# Patient Record
Sex: MALE | Race: WHITE | NOT HISPANIC OR LATINO | Employment: FULL TIME | URBAN - METROPOLITAN AREA
[De-identification: names, ages, dates, MRNs, and addresses within clinical notes are randomized per-mention and may not be internally consistent; named-entity substitution may affect disease eponyms.]

---

## 2018-09-24 ENCOUNTER — OFFICE VISIT (OUTPATIENT)
Dept: FAMILY MEDICINE | Facility: CLINIC | Age: 53
End: 2018-09-24
Payer: COMMERCIAL

## 2018-09-24 VITALS
HEART RATE: 64 BPM | SYSTOLIC BLOOD PRESSURE: 159 MMHG | BODY MASS INDEX: 34.86 KG/M2 | WEIGHT: 257 LBS | RESPIRATION RATE: 18 BRPM | TEMPERATURE: 97.3 F | DIASTOLIC BLOOD PRESSURE: 87 MMHG | OXYGEN SATURATION: 98 %

## 2018-09-24 DIAGNOSIS — S83.411A SPRAIN OF MEDIAL COLLATERAL LIGAMENT OF RIGHT KNEE, INITIAL ENCOUNTER: Primary | ICD-10-CM

## 2018-09-24 PROCEDURE — 99214 OFFICE O/P EST MOD 30 MIN: CPT | Performed by: FAMILY MEDICINE

## 2018-09-24 ASSESSMENT — PAIN SCALES - GENERAL: PAINLEVEL: SEVERE PAIN (6)

## 2018-09-24 NOTE — NURSING NOTE
Chief Complaint   Patient presents with     Knee Pain     right x 3 weeks       Initial /87  Pulse 64  Temp 97.3  F (36.3  C) (Oral)  Resp 18  Wt 257 lb (116.6 kg)  SpO2 98%  BMI 34.86 kg/m2 Estimated body mass index is 34.86 kg/(m^2) as calculated from the following:    Height as of 3/28/16: 6' (1.829 m).    Weight as of this encounter: 257 lb (116.6 kg).  Medication Reconciliation: complete  Melvi Michele M.A.

## 2018-09-24 NOTE — PROGRESS NOTES
SUBJECTIVE:  Mason Durán is a 52 year old male who is seen as self referral for right knee pain that started  3 weeks ago. The onset of the pain was gradual.    The pain was first noticed while the patient was walking.    The patient reports that these symptoms have been waxing and waning since that time. He reports popping, clicking of the knee. He denies locking of the knee.   Palliative factors for the pain include:  Rest   Provacative factors include: pivoting while weight bearing on that leg  The patient relates no prior history of problems in this knee.  The patient reports that he is employed as a  and does have a physical job that demands use of the affected knee.  The patient reports that he does not play any sports but does hunt. He was in the woods recently getting ready for deer season.   The patients past medical, surgical, social and family histories were reviewed.    He denies a history of gout personally or in his family.      Social History     Social History     Marital status:      Spouse name: N/A     Number of children: N/A     Years of education: N/A     Social History Main Topics     Smoking status: Current Every Day Smoker     Packs/day: 1.00     Types: Cigarettes     Smokeless tobacco: Never Used     Alcohol use Yes      Comment: occasionally     Drug use: No     Sexual activity: Yes     Partners: Female     Other Topics Concern     None     Social History Narrative           OBJECTIVE:  /87  Pulse 64  Temp 97.3  F (36.3  C) (Oral)  Resp 18  Wt 257 lb (116.6 kg)  SpO2 98%  BMI 34.86 kg/m2  GENERAL APPEARANCE: healthy, alert and no distress  GAIT: NORMAL   SKIN: no suspicious lesions or rashes  NEURO: Normal strength and tone, mentation intact and speech normal  PSYCH:  mentation appears normal and affect normal/bright  KNEE EXAM:  Inspection: No effusion, No quad atrophy  Tender: MCL  Non-tender: lateral patellar facet, medial patellar facet, inferior pole  patella, patella tendon, quadriceps insertion, LCL, lateral joint line, medial joint line, medial tibial plateau, lateral tibial plateau, medial femoral condyle, lateral femoral condyle, distal IT band, prepatellar bursa, popliteal region, pes anserine bursa  Active Range of Motion: full flexion, full extension    Special tests: no apprehension with lateral stress of the patella, no apprehension with medial stress of the patella, normal Valgus stress test but it did induce pain on the medial knee, normal Varus, negative Lachman's test, negative Albert's , negative Apley's Compression test, negative Apley's distraction test        X-RAY INTERPRETATION:  No Knee X-Rays indicated    ASSESSMENT/PLAN:  medial collateral ligament strain grade    NSAID was prescribed, I recommend(ed) ice at the end of the day(s) if painful and heat and stretching at the beginning of the day     Follow up in 2 weeks if not improving.

## 2018-09-24 NOTE — MR AVS SNAPSHOT
After Visit Summary   9/24/2018    Mason Durán    MRN: [id number]           Patient Information     Date Of Birth          1965        Visit Information        Provider Department      9/24/2018 1:45 PM Javier Vang MD [facility]        Today's Diagnoses     Sprain of medial collateral ligament of right knee, initial encounter    -  1      Care Instructions      Understanding Medial Collateral Ligament Sprain    The knee is a complex joint where the thighbone (femur) meets the shinbone (tibia). Strong tissues called ligaments connect these bones together. Ligaments also keep the bones aligned, so the knee only bends how it is supposed to. The medial collateral ligament (MCL) runs across the knee joint on the medial side of the leg. Injury to this ligament may be very painful. The knee may also not work the way it should.  Causes of an MCL sprain  An MCL sprain often happens when the knee joint is pushed beyond its normal range of motion. It is most common during a blow to the knee from the outside, pushing the knee inward. It may also happen if the knee is forced into a twist. These movements stretch and tear the MCL. Other parts of the knee may be damaged along with the MCL.  Symptoms of an MCL sprain  These include:    Knee pain    Knee swelling    Locking of the joint    Wobbly or unstable feeling in the joint  Treatment for an MCL sprain  Treatment will depend on the severity of the sprain and whether there is damage to other parts of the knee. Options often include:    Rest. This allows the knee to heal. Activities that stress the knee should be avoided. Crutches, a knee brace, or both may also be recommended for a short time.    Cold packs and elevation of the knee. These help reduce swelling and relieve pain.    Compression. The knee may be wrapped with a bandage to help reduce swelling.    Medicines. These help relieve pain and swelling.    Exercises. These help  "improve the knee s stability, strength, and range of motion.  If the injury is severe or several parts of the joint are involved, surgery may be an option. Surgery repairs the MCL and any other damaged structures.     When to call your healthcare provider  Call your healthcare provider right away if you have any of these:    Pain, swelling, or instability that doesn t get better with treatment or gets worse    New symptoms   Date Last Reviewed: 3/10/2016    [id number] [employer]. [address] [place] [address]. All rights reserved. This information is not intended as a substitute for professional medical care. Always follow your healthcare professional's instructions.                Follow-ups after your visit        Follow-up notes from your care team     Return in about 2 weeks (around 10/8/2018), or if symptoms worsen or fail to improve.      Who to contact     If you have questions or need follow up information about today's clinic visit or your schedule please contact [facility] directly at [phone].  Normal or non-critical lab and imaging results will be communicated to you by Arrogenehart, letter or phone within 4 business days after the clinic has received the results. If you do not hear from us within 7 days, please contact the clinic through [employer] or phone. If you have a critical or abnormal lab result, we will notify you by phone as soon as possible.  Submit refill requests through [employer]ipai or call your pharmacy and they will forward the refill request to us. Please allow 3 business days for your refill to be completed.          Additional Information About Your Visit        ArrogeneharNexercise Information     [employer] lets you send messages to your doctor, view your test results, renew your prescriptions, schedule appointments and more. To sign up, go to [email or web address] . Click on \"Log in\" on the left side of the screen, which will take you to the Welcome page. Then " "click on \"Sign up Now\" on the right side of the page.     You will be asked to enter the access code listed below, as well as some personal information. Please follow the directions to create your username and password.     Your access code is: [email or web address]  Expires: 2018  2:38 PM     Your access code will  in 90 days. If you need help or a new code, please call your [place] clinic or [phone].        Care EveryWhere ID     This is your Care EveryWhere ID. This could be used by other organizations to access your [place] medical records  [id number]        Your Vitals Were     Pulse Temperature Respirations Pulse Oximetry BMI (Body Mass Index)       64 97.3  F (36.3  C) (Oral) 18 98% 34.86 kg/m2        Blood Pressure from Last 3 Encounters:   18 159/87   16 122/82   16 129/82    Weight from Last 3 Encounters:   18 257 lb (116.6 kg)   16 252 lb (114.3 kg)   16 263 lb (119.3 kg)              Today, you had the following     No orders found for display         Today's Medication Changes          These changes are accurate as of 18  2:38 PM.  If you have any questions, ask your nurse or doctor.               Start taking these medicines.        Dose/Directions    nabumetone 750 MG tablet   Commonly known as:  RELAFEN   Used for:  Sprain of medial collateral ligament of right knee, initial encounter   Started by:  Javier Vang MD        1 tablet twice a day for 10 days and then twice a day only as needed   Quantity:  60 tablet   Refills:  1            Where to get your medicines      These medications were sent to [facility] [address] [facility]  [facility] [address] [facility] [address] [facility]   [facility] [address]     Phone:  [phone]     nabumetone 750 MG tablet                Primary Care Provider Office Phone # Fax #    Samuel Anders [phone] [address] [facility] " [phone]        Equal Access to Services     [facility] : Hadii aad ku hadyumikotorin Gretchenali, wabonnieda lufortunatoronha, qaem ankuralexclayton jett, uyen gomes. So [facility] [phone].    ATENCIÓN: Si habla español, tiene a soto disposición servicios gratuitos de asistencia lingüística. Llame al [phone].    We comply with applicable federal civil rights laws and [place] laws. We do not discriminate on the basis of race, color, national origin, age, disability, sex, sexual orientation, or gender identity.            Thank you!     Thank you for choosing [facility]  for your care. Our goal is always to provide you with excellent care. Hearing back from our patients is one way we can continue to improve our services. Please take a few minutes to complete the written survey that you may receive in the mail after your visit with us. Thank you!             Your Updated Medication List - Protect others around you: Learn how to safely use, store and throw away your medicines at www.disposemymeds.org.          This list is accurate as of 9/24/18  2:38 PM.  Always use your most recent med list.                   Brand Name Dispense Instructions for use Diagnosis    ibuprofen 200 MG tablet    ADVIL/MOTRIN     Take 200 mg by mouth every 4 hours as needed.        nabumetone 750 MG tablet    RELAFEN    60 tablet    1 tablet twice a day for 10 days and then twice a day only as needed    Sprain of medial collateral ligament of right knee, initial encounter       spacer/aero-hold chamber mask Chayito     1 each    1 Adult size spacer to use with MDI inhalers.    Acute bronchospasm

## 2018-09-24 NOTE — PATIENT INSTRUCTIONS
Understanding Medial Collateral Ligament Sprain    The knee is a complex joint where the thighbone (femur) meets the shinbone (tibia). Strong tissues called ligaments connect these bones together. Ligaments also keep the bones aligned, so the knee only bends how it is supposed to. The medial collateral ligament (MCL) runs across the knee joint on the medial side of the leg. Injury to this ligament may be very painful. The knee may also not work the way it should.  Causes of an MCL sprain  An MCL sprain often happens when the knee joint is pushed beyond its normal range of motion. It is most common during a blow to the knee from the outside, pushing the knee inward. It may also happen if the knee is forced into a twist. These movements stretch and tear the MCL. Other parts of the knee may be damaged along with the MCL.  Symptoms of an MCL sprain  These include:    Knee pain    Knee swelling    Locking of the joint    Wobbly or unstable feeling in the joint  Treatment for an MCL sprain  Treatment will depend on the severity of the sprain and whether there is damage to other parts of the knee. Options often include:    Rest. This allows the knee to heal. Activities that stress the knee should be avoided. Crutches, a knee brace, or both may also be recommended for a short time.    Cold packs and elevation of the knee. These help reduce swelling and relieve pain.    Compression. The knee may be wrapped with a bandage to help reduce swelling.    Medicines. These help relieve pain and swelling.    Exercises. These help improve the knee s stability, strength, and range of motion.  If the injury is severe or several parts of the joint are involved, surgery may be an option. Surgery repairs the MCL and any other damaged structures.     When to call your healthcare provider  Call your healthcare provider right away if you have any of these:    Pain, swelling, or instability that doesn t get better with treatment or gets  worse    New symptoms   Date Last Reviewed: 3/10/2016    [id number] [employer]. [address] [facility], [place], [place] [address]. All rights reserved. This information is not intended as a substitute for professional medical care. Always follow your healthcare professional's instructions.

## 2018-11-28 ENCOUNTER — OFFICE VISIT (OUTPATIENT)
Dept: FAMILY MEDICINE | Facility: CLINIC | Age: 53
End: 2018-11-28
Payer: COMMERCIAL

## 2018-11-28 VITALS
DIASTOLIC BLOOD PRESSURE: 67 MMHG | BODY MASS INDEX: 34.58 KG/M2 | SYSTOLIC BLOOD PRESSURE: 139 MMHG | HEART RATE: 64 BPM | OXYGEN SATURATION: 99 % | RESPIRATION RATE: 14 BRPM | WEIGHT: 255 LBS | TEMPERATURE: 98 F

## 2018-11-28 DIAGNOSIS — Z01.818 PREOP GENERAL PHYSICAL EXAM: Primary | ICD-10-CM

## 2018-11-28 DIAGNOSIS — M25.561 RIGHT KNEE PAIN, UNSPECIFIED CHRONICITY: ICD-10-CM

## 2018-11-28 LAB — HGB BLD-MCNC: 14.7 G/DL (ref 13.3–17.7)

## 2018-11-28 PROCEDURE — 36415 COLL VENOUS BLD VENIPUNCTURE: CPT | Performed by: PHYSICIAN ASSISTANT

## 2018-11-28 PROCEDURE — 85018 HEMOGLOBIN: CPT | Performed by: PHYSICIAN ASSISTANT

## 2018-11-28 PROCEDURE — 93000 ELECTROCARDIOGRAM COMPLETE: CPT | Performed by: PHYSICIAN ASSISTANT

## 2018-11-28 PROCEDURE — 99214 OFFICE O/P EST MOD 30 MIN: CPT | Performed by: PHYSICIAN ASSISTANT

## 2018-11-28 ASSESSMENT — ANXIETY QUESTIONNAIRES
7. FEELING AFRAID AS IF SOMETHING AWFUL MIGHT HAPPEN: NOT AT ALL
2. NOT BEING ABLE TO STOP OR CONTROL WORRYING: NOT AT ALL
IF YOU CHECKED OFF ANY PROBLEMS ON THIS QUESTIONNAIRE, HOW DIFFICULT HAVE THESE PROBLEMS MADE IT FOR YOU TO DO YOUR WORK, TAKE CARE OF THINGS AT HOME, OR GET ALONG WITH OTHER PEOPLE: NOT DIFFICULT AT ALL
5. BEING SO RESTLESS THAT IT IS HARD TO SIT STILL: NOT AT ALL
6. BECOMING EASILY ANNOYED OR IRRITABLE: NOT AT ALL
GAD7 TOTAL SCORE: 0
3. WORRYING TOO MUCH ABOUT DIFFERENT THINGS: NOT AT ALL
1. FEELING NERVOUS, ANXIOUS, OR ON EDGE: NOT AT ALL

## 2018-11-28 ASSESSMENT — PATIENT HEALTH QUESTIONNAIRE - PHQ9
SUM OF ALL RESPONSES TO PHQ QUESTIONS 1-9: 1
5. POOR APPETITE OR OVEREATING: NOT AT ALL

## 2018-11-28 NOTE — PROGRESS NOTES
[facility]  [address] [facility] [address] [phone]  Dept: [phone]    PRE-OP EVALUATION:  Today's date: 2018    Mason Durán (: 1965) presents for pre-operative evaluation assessment as requested by Dr. Velazquez.  He requires evaluation and anesthesia risk assessment prior to undergoing surgery/procedure for treatment of right knee pain .    Fax number for surgical facility: [phone]  Primary Physician: Samuel Anders  Type of Anesthesia Anticipated: General    Patient has a Health Care Directive or Living Will:  NO    Preop Questions 2018   Who is doing your surgery? Dr Velazquez   What are you having done? right knee partial medial meniscectomy   Date of Surgery/Procedure: 2018   1.  Do you have a history of Heart attack, stroke, stent, coronary bypass surgery, or other heart surgery? No   2.  Do you ever have any pain or discomfort in your chest? No   3.  Do you have a history of  Heart Failure? No   4.   Are you troubled by shortness of breath when:  walking on a level surface, or up a slight hill, or at night? No   5.  Do you currently have a cold, bronchitis or other respiratory infection? No   6.  Do you have a cough, shortness of breath, or wheezing? No   7.  Do you sometimes get pains in the calves of your legs when you walk? YES - from injury   8. Do you or anyone in your family have previous history of blood clots? No   9.  Do you or does anyone in your family have a serious bleeding problem such as prolonged bleeding following surgeries or cuts? No   10. Have you ever had problems with anemia or been told to take iron pills? No   11. Have you had any abnormal blood loss such as black, tarry or bloody stools? No   12. Have you ever had a blood transfusion? No   13. Have you or any of your relatives ever had problems with anesthesia? No   14. Do you have sleep apnea, excessive snoring or daytime drowsiness? No   15. Do you  have any prosthetic heart valves? No   16. Do you have prosthetic joints? No         HPI:     HPI related to upcoming procedure: insidious right knee pain about 3 months ago.      See problem list for active medical problems.  Problems all longstanding and stable, except as noted/documented.  See ROS for pertinent symptoms related to these conditions.                                                                                                                                                          .    MEDICAL HISTORY:     Patient Active Problem List    Diagnosis Date Noted     Right knee pain, unspecified chronicity 11/28/2018     Priority: Medium     10 year risk of MI or stroke 7.5% or greater 05/12/2015     Priority: Medium     Opiate use 04/30/2015     Priority: Medium     Patient is followed by [facility] for ongoing prescription of narcotic pain medicine.    Med: Norco.   Maximum use: 40 pills, 3-4 times per year  Expected duration: indefinite  Narcotic agreement on file: YES  Clinic visit recommended: for each refill         Lumbosacral radiculopathy 01/07/2014     Priority: Medium     Hyperlipidemia LDL goal <160 04/22/2013     Priority: Medium     Obesity 05/14/2012     Priority: Medium     Chronic low back pain 05/31/2011     Priority: Medium      Past Medical History:   Diagnosis Date     NO ACTIVE PROBLEMS      Past Surgical History:   Procedure Laterality Date     BACK SURGERY      discectomy     No current outpatient prescriptions on file.     OTC products: None, except as noted above    Allergies   Allergen Reactions     Nkda [No Known Drug Allergies]       Latex Allergy: NO    Social History   Substance Use Topics     Smoking status: Current Every Day Smoker     Packs/day: 1.00     Types: Cigarettes     Smokeless tobacco: Never Used     Alcohol use Yes      Comment: occasionally     History   Drug Use No       REVIEW OF SYSTEMS:   CONSTITUTIONAL: NEGATIVE for fever, chills, change in  weight  INTEGUMENTARY/SKIN: NEGATIVE for worrisome rashes, moles or lesions  EYES: NEGATIVE for vision changes or irritation  ENT/MOUTH: NEGATIVE for ear, mouth and throat problems  RESP: NEGATIVE for significant cough or SOB  CV: NEGATIVE for chest pain, palpitations or peripheral edema  GI: NEGATIVE for nausea, abdominal pain, heartburn, or change in bowel habits  : NEGATIVE for frequency, dysuria, or hematuria  MUSCULOSKELETAL: NEGATIVE for significant arthralgias or myalgia  NEURO: NEGATIVE for weakness, dizziness or paresthesias  ENDOCRINE: NEGATIVE for temperature intolerance, skin/hair changes  HEME: NEGATIVE for bleeding problems  PSYCHIATRIC: NEGATIVE for changes in mood or affect    EXAM:   /67  Pulse 64  Temp 98  F (36.7  C) (Oral)  Resp 14  Wt 255 lb (115.7 kg)  SpO2 99%  BMI 34.58 kg/m2    GENERAL APPEARANCE: healthy, alert and no distress     EYES: EOMI,  PERRL     HENT: ear canals and TM's normal and nose and mouth without ulcers or lesions     NECK: no adenopathy, no asymmetry, masses, or scars and thyroid normal to palpation     RESP: lungs clear to auscultation - no rales, rhonchi or wheezes     CV: regular rates and rhythm, normal S1 S2, no S3 or S4 and no murmur, click or rub     ABDOMEN:  soft, nontender, no HSM or masses and bowel sounds normal     MS: extremities normal- no gross deformities noted, no evidence of inflammation in joints, FROM in all extremities.     SKIN: no suspicious lesions or rashes     NEURO: Normal strength and tone, sensory exam grossly normal, mentation intact and speech normal     PSYCH: mentation appears normal. and affect normal/bright     LYMPHATICS: No cervical adenopathy    DIAGNOSTICS:     EKG: appears normal, NSR, normal axis, normal intervals, no acute ST/T changes c/w ischemia, no LVH by voltage criteria, there are no prior tracings available  Labs Resulted Today:   Results for orders placed or performed in visit on 11/28/18   Hemoglobin   Result  Value Ref Range    Hemoglobin 14.7 13.3 - 17.7 g/dL       Recent Labs   Lab Test  05/09/15   0908  09/01/12   1119   HGB  15.5   --    PLT  214   --    NA  140  139   POTASSIUM  4.6  4.8   CR  0.94  0.95        IMPRESSION:   Reason for surgery/procedure:  treatment of right knee pain     The proposed surgical procedure is considered INTERMEDIATE risk.    REVISED CARDIAC RISK INDEX  The patient has the following serious cardiovascular risks for perioperative complications such as (MI, PE, VFib and 3  AV Block):  No serious cardiac risks  INTERPRETATION: 0 risks: Class I (very low risk - 0.4% complication rate)    The patient has the following additional risks for perioperative complications:  No identified additional risks      ICD-10-CM    1. Preop general physical exam Z01.818 EKG 12-lead complete w/read - Clinics     Hemoglobin   2. Right knee pain, unspecified chronicity M25.561 Hemoglobin       RECOMMENDATIONS:     APPROVAL GIVEN to proceed with proposed procedure, without further diagnostic evaluation       Signed Electronically by: Hong Carpio PA-C  Discussed this patient with  Hong Carpio and agree with above assessment and plan. The patient is an acceptable candidate for the proposed anasthesia.  Javier Vang MD      Copy of this evaluation report is provided to requesting physician.    [facility] Preop Guidelines    Revised Cardiac Risk Index

## 2018-11-28 NOTE — MR AVS SNAPSHOT
After Visit Summary   11/28/2018    Mason Durán    MRN: [id number]           Patient Information     Date Of Birth          1965        Visit Information        Provider Department      11/28/2018 2:30 PM Hong Carpio PA-C [facility]        Today's Diagnoses     Preop general physical exam    -  1    Right knee pain, unspecified chronicity          Care Instructions      Before Your Surgery      Call your surgeon if there is any change in your health. This includes signs of a cold or flu (such as a sore throat, runny nose, cough, rash or fever).    Do not smoke, drink alcohol or take over the counter medicine (unless your surgeon or primary care doctor tells you to) for the 24 hours before and after surgery.    If you take prescribed drugs: Follow your doctor s orders about which medicines to take and which to stop until after surgery.    Eating and drinking prior to surgery: follow the instructions from your surgeon    Take a shower or bath the night before surgery. Use the soap your surgeon gave you to gently clean your skin. If you do not have soap from your surgeon, use your regular soap. Do not shave or scrub the surgery site.  Wear clean pajamas and have clean sheets on your bed.           Follow-ups after your visit        Who to contact     If you have questions or need follow up information about today's clinic visit or your schedule please contact [facility] directly at [phone].  Normal or non-critical lab and imaging results will be communicated to you by MyChart, letter or phone within 4 business days after the clinic has received the results. If you do not hear from us within 7 days, please contact the clinic through MyChart or phone. If you have a critical or abnormal lab result, we will notify you by phone as soon as possible.  Submit refill requests through [employer] or call your pharmacy and they will forward the refill request to us.  "Please allow 3 business days for your refill to be completed.          Additional Information About Your Visit        MyChart Information     [employer] [facility] [employer] lets you send messages to your doctor, view your test results, renew your prescriptions, schedule appointments and more. To sign up, go to [email or web address] . Click on \"Log in\" on the left side of the screen, which will take you to the Welcome page. Then click on \"Sign up Now\" on the right side of the page.     You will be asked to enter the access code listed below, as well as some personal information. Please follow the directions to create your username and password.     Your access code is: [email or web address]  Expires: 2018  1:38 PM     Your access code will  in 90 days. If you need help or a new code, please call your [facility] clinic or [phone].        Care EveryWhere ID     This is your [facility] EveryWhere ID. This could be used by other organizations to access your [facility] medical records  [id number]        Your Vitals Were     Pulse Temperature Respirations Pulse Oximetry BMI (Body Mass Index)       64 98  F (36.7  C) (Oral) 14 99% 34.58 kg/m2        Blood Pressure from Last 3 Encounters:   18 139/67   18 159/87   16 122/82    Weight from Last 3 Encounters:   18 255 lb (115.7 kg)   18 257 lb (116.6 kg)   16 252 lb (114.3 kg)              We Performed the Following     EKG 12-lead complete w/read - Clinics     Hemoglobin        Primary Care Provider Office Phone # Fax #    Samuel Anders [phone] [address] [facility] [address]        Equal Access to Services     JAIME BELTRE : Jcarlos Amaya, billie jordan, jero pascualalmaclayton jett, uyen soni So [facility] [phone].    ATENCIÓN: Si habla español, tiene a soto disposición servicios gratuitos de asistencia lingüística. Sabrina al [phone].    We comply with applicable federal " civil rights laws and [place] laws. We do not discriminate on the basis of race, color, national origin, age, disability, sex, sexual orientation, or gender identity.            Thank you!     Thank you for [facility]  for your care. Our goal is always to provide you with excellent care. Hearing back from our patients is one way we can continue to improve our services. Please take a few minutes to complete the written survey that you may receive in the mail after your visit with us. Thank you!             Your Updated Medication List - Protect others around you: Learn how to safely use, store and throw away your medicines at www.disposemymeds.org.      Notice  As of 11/28/2018  2:53 PM    You have not been prescribed any medications.

## 2018-11-29 ASSESSMENT — ANXIETY QUESTIONNAIRES: GAD7 TOTAL SCORE: 0

## 2019-08-21 ENCOUNTER — OFFICE VISIT (OUTPATIENT)
Dept: FAMILY MEDICINE | Facility: CLINIC | Age: 54
End: 2019-08-21
Payer: COMMERCIAL

## 2019-08-21 VITALS
OXYGEN SATURATION: 96 % | SYSTOLIC BLOOD PRESSURE: 155 MMHG | BODY MASS INDEX: 37.52 KG/M2 | DIASTOLIC BLOOD PRESSURE: 80 MMHG | WEIGHT: 268 LBS | HEART RATE: 91 BPM | HEIGHT: 71 IN | TEMPERATURE: 98.8 F

## 2019-08-21 DIAGNOSIS — R42 LIGHTHEADEDNESS: ICD-10-CM

## 2019-08-21 DIAGNOSIS — R03.0 ELEVATED BLOOD-PRESSURE READING WITHOUT DIAGNOSIS OF HYPERTENSION: ICD-10-CM

## 2019-08-21 DIAGNOSIS — H61.23 BILATERAL IMPACTED CERUMEN: Primary | ICD-10-CM

## 2019-08-21 PROBLEM — E66.01 MORBID OBESITY (H): Status: ACTIVE | Noted: 2019-08-21

## 2019-08-21 PROCEDURE — 99214 OFFICE O/P EST MOD 30 MIN: CPT | Performed by: FAMILY MEDICINE

## 2019-08-21 ASSESSMENT — MIFFLIN-ST. JEOR: SCORE: 2082.77

## 2019-08-21 NOTE — PROGRESS NOTES
"Chief complaint: ear plugged    Patient usually uses ear muffs  Past month using the ear plugs  The left ear started getting plugged  Patient tried to use a qtip no relief    BP elevated today but asymptomatic. No headache no blurring of vision no chest pain no shortness of breath no dizziness no unsteadiness no numbness no weakness    Patient however said 3 days ago it was very hot and patient was in the grocery with wife  Had a spell of lightheadedness he thought he was going to pass out  Just a couple of seconds  He sat down then got better  Chest pain or palpitation: No   Motor,sensory weakness, or slurring of speech: No  Headache: No  Blurring of vision : No  Nausea: YES  Vomiting: No  Abdominal pain: No  Recent trauma: No    Tried supportive treatment  At home no relief  Additional Information: above    Problem list and histories reviewed & adjusted, as indicated.  Additional history: as documented    Problem list, Medication list, Allergies, and Medical/Social/Surgical histories reviewed in EPIC and updated as appropriate.    ROS:  Constitutional, HEENT, cardiovascular, pulmonary, gi and gu systems are negative, except as otherwise noted.    OBJECTIVE:                                                    BP (!) 155/80   Pulse 91   Temp 98.8  F (37.1  C) (Oral)   Ht 1.803 m (5' 11\")   Wt 121.6 kg (268 lb)   SpO2 96%   BMI 37.38 kg/m    Body mass index is 37.38 kg/m .  GENERAL: healthy, alert and no distress  EYES: Eyes grossly normal to inspection, PERRL and conjunctivae and sclerae normal  HENT:   Initially had bilateral impacted cerumen - RELIEVED BY EAR FLUSHING  Remaining cerumen on right external auditory canal was removed by me by currette  ear canals and TM's normal, nose and mouth without ulcers or lesions  NECK: no adenopathy, no asymmetry, masses, or scars and thyroid normal to palpation  RESP: lungs clear to auscultation - no rales, rhonchi or wheezes  CV: regular rate and rhythm, normal S1 S2, " no S3 or S4, no murmur, click or rub, no peripheral edema and peripheral pulses strong  ABDOMEN: soft, nontender, no hepatosplenomegaly, no masses and bowel sounds normal  MS: no gross musculoskeletal defects noted, no edema  SKIN: no suspicious lesions or rashes  NEURO: Normal strength and tone, mentation intact and speech normal  PSYCH: mentation appears normal, affect normal/bright    Diagnostic Test Results:  none      ASSESSMENT/PLAN:                                                        ICD-10-CM    1. Bilateral impacted cerumen H61.23    2. Elevated blood-pressure reading without diagnosis of hypertension R03.0    3. Lightheadedness R42      Ear flushing done and currette removed ear wax - complete resolution    Your blood pressure reading was elevated today.  Recommend that you have it re-checked either at home or at our clinic within a week  Avoid cold medicines that have pseudoephedrin in it, or Sudafed. You may take regular or plain Robitussin or Mucinex  If you are unsure, please ask the pharmacist    If your blood pressure top number (systolic) 180 and above, or the bottom number (diastolic) 120 and above, you need to be seen immediately.  If persistently elevated top number 140 and above, or the bottom number 90 and above, please schedule an appointment to see a provider in clinic within a week.  If you have very elevated blood pressures, accompanied by headache, chest pain, numbness, weakness, slurring of speech, confusion, difficulty walking, call 911 and go to the ER    One episode of lightheadedness - appears vasovagal - was very hot that day  Recommended EKG and further evaluation however patient declines  If with any symptoms of chest pain or shortness of breath, lightheadedness, palpitations, feeling like passing out or change and worsening in the quality of your symptoms, please proceed to ER. Recommend follow up with PCP in a few days for re-evaluation.   Signs and symptoms of worsening  dizziness discussed. Alarm symptoms of possible CVA or cardiac events discussed. If with any of these advised to go to ER.  Patient voiced understanding and had no further questions.     MD Tabitha Champion MD  [employer]

## 2019-09-06 ENCOUNTER — ALLIED HEALTH/NURSE VISIT (OUTPATIENT)
Dept: FAMILY MEDICINE | Facility: CLINIC | Age: 54
End: 2019-09-06
Payer: COMMERCIAL

## 2019-09-06 VITALS — SYSTOLIC BLOOD PRESSURE: 142 MMHG | HEART RATE: 76 BPM | DIASTOLIC BLOOD PRESSURE: 66 MMHG

## 2019-09-06 DIAGNOSIS — Z01.30 BP CHECK: Primary | ICD-10-CM

## 2019-09-06 PROCEDURE — 99207 ZZC NO CHARGE NURSE ONLY: CPT | Performed by: FAMILY MEDICINE

## 2019-09-06 NOTE — PROGRESS NOTES
Mason BART Luis Armando was evaluated at [facility] on September 6, 2019 at which time his blood pressure was:    BP Readings from Last 3 Encounters:   09/06/19 (!) 142/66   08/21/19 (!) 155/80   11/28/18 139/67     Pulse Readings from Last 3 Encounters:   09/06/19 76   08/21/19 91   11/28/18 64       Reviewed lifestyle modifications for blood pressure control and reduction: including making healthy food choices, managing weight, getting regular exercise, smoking cessation, reducing alcohol consumption, monitoring blood pressure regularly.     Symptoms: None    BP Goal:Other: bp high    BP Assessment:  BP too high    Potential Reasons for BP too high: Unknown/Other: unknown    BP Follow-Up Plan: Referral to PCP    Recommendation to Provider: .    Note completed by:Amelia Bone RPh  [facility]    [phone]

## 2019-09-12 ENCOUNTER — TELEPHONE (OUTPATIENT)
Dept: FAMILY MEDICINE | Facility: CLINIC | Age: 54
End: 2019-09-12

## 2019-09-12 NOTE — LETTER
September 13, 2019    Mason Durán  [address] [facility] [address]    Dear Mason,     Dr Anders reviewed your blood pressure reading from the pharmacy. Dr Anders would like you to make a fasting lab appointment and an appointment for a physical. Your last visit with Dr Anders was in 2015.      Thank you,   Your [facility] Team/  [phone]

## 2019-09-12 NOTE — TELEPHONE ENCOUNTER
BP results were routed to me.    Last visit with me 2015.    Please ask the patient to see me for a physical with fasting labs.    Samuel Anders M.D.

## 2019-09-12 NOTE — TELEPHONE ENCOUNTER
The message on the voicemail says the wireless caller you are trying to reach is not available and to try back, then a fast busy signal. I could not leave a message.Maxine Hayden MA/SAY

## 2019-09-20 ENCOUNTER — TELEPHONE (OUTPATIENT)
Dept: FAMILY MEDICINE | Facility: CLINIC | Age: 54
End: 2019-09-20

## 2019-09-20 NOTE — LETTER
September 20, 2019      Masno Durán  [address] [facility] [address]        Dear [facility] Patient,    Your provider reviewed your medical record and found that you are due for colorectal cancer screening. Having regular screenings helps detect cancer early.  In the past, you have completed a FIT test (Fecal Immunochemical Test) for your colorectal cancer screening. This test looks for blood in your stool and is done once a year.   You have a few options available, the FIT testing you have done in the past or the following below:  A Cologuard test may be completed at home. Cologuard uses a DNA marker in your stool to detect colon cancer and some precancerous polyps. This test is to be completed every three years.    Another option for colorectal cancer screening is a colonoscopy test. A colonoscopy is a procedure that is performed to see the inside of the and rectum by using a long, thin, and flexible tube with a tiny video camera and light at the end. This test is done every 10 years if it is normal.    Please choose one of these options. If you would like to have one of these tests ordered, please call us at [phone] or send us a message via [employer].    If you have had a colorectal cancer screening done outside of [facility] please let us know so we can update your medical record.    Please let us know if you have any questions.    Sincerely,  [facility], [place]

## 2021-10-21 NOTE — NURSING NOTE
Chief Complaint   Patient presents with     Pre-Op Exam     Health Maintenance     SALVADOR, PHQ9, Flu       Initial /67  Pulse 64  Temp 98  F (36.7  C) (Oral)  Resp 14  Wt 255 lb (115.7 kg)  SpO2 99%  BMI 34.58 kg/m2 Estimated body mass index is 34.58 kg/(m^2) as calculated from the following:    Height as of 3/28/16: 6' (1.829 m).    Weight as of this encounter: 255 lb (115.7 kg).  Medication Reconciliation: complete  Amelia Hamilton, CMA    
minimum assist (75% patients effort)

## 2023-02-02 ENCOUNTER — OFFICE VISIT (OUTPATIENT)
Dept: FAMILY MEDICINE | Facility: CLINIC | Age: 58
End: 2023-02-02
Payer: COMMERCIAL

## 2023-02-02 ENCOUNTER — LAB (OUTPATIENT)
Dept: FAMILY MEDICINE | Facility: CLINIC | Age: 58
End: 2023-02-02

## 2023-02-02 VITALS
OXYGEN SATURATION: 97 % | HEART RATE: 68 BPM | DIASTOLIC BLOOD PRESSURE: 68 MMHG | BODY MASS INDEX: 38.22 KG/M2 | HEIGHT: 71 IN | SYSTOLIC BLOOD PRESSURE: 122 MMHG | WEIGHT: 273 LBS | TEMPERATURE: 97.4 F

## 2023-02-02 DIAGNOSIS — M54.41 CHRONIC BILATERAL LOW BACK PAIN WITH RIGHT-SIDED SCIATICA: Primary | ICD-10-CM

## 2023-02-02 DIAGNOSIS — Z12.11 SCREEN FOR COLON CANCER: ICD-10-CM

## 2023-02-02 DIAGNOSIS — G89.29 CHRONIC BILATERAL LOW BACK PAIN WITH RIGHT-SIDED SCIATICA: Primary | ICD-10-CM

## 2023-02-02 PROCEDURE — 99204 OFFICE O/P NEW MOD 45 MIN: CPT | Performed by: NURSE PRACTITIONER

## 2023-02-02 RX ORDER — METHYLPREDNISOLONE 4 MG
TABLET, DOSE PACK ORAL
Qty: 21 TABLET | Refills: 0 | Status: SHIPPED | OUTPATIENT
Start: 2023-02-02 | End: 2023-08-03

## 2023-02-02 RX ORDER — CYCLOBENZAPRINE HCL 10 MG
10 TABLET ORAL 3 TIMES DAILY PRN
Qty: 30 TABLET | Refills: 0 | Status: SHIPPED | OUTPATIENT
Start: 2023-02-02 | End: 2023-02-21

## 2023-02-02 ASSESSMENT — ENCOUNTER SYMPTOMS: BACK PAIN: 1

## 2023-02-02 ASSESSMENT — ANXIETY QUESTIONNAIRES
GAD7 TOTAL SCORE: 0
7. FEELING AFRAID AS IF SOMETHING AWFUL MIGHT HAPPEN: NOT AT ALL
5. BEING SO RESTLESS THAT IT IS HARD TO SIT STILL: NOT AT ALL
GAD7 TOTAL SCORE: 0
8. IF YOU CHECKED OFF ANY PROBLEMS, HOW DIFFICULT HAVE THESE MADE IT FOR YOU TO DO YOUR WORK, TAKE CARE OF THINGS AT HOME, OR GET ALONG WITH OTHER PEOPLE?: NOT DIFFICULT AT ALL
1. FEELING NERVOUS, ANXIOUS, OR ON EDGE: NOT AT ALL
2. NOT BEING ABLE TO STOP OR CONTROL WORRYING: NOT AT ALL
3. WORRYING TOO MUCH ABOUT DIFFERENT THINGS: NOT AT ALL
6. BECOMING EASILY ANNOYED OR IRRITABLE: NOT AT ALL
IF YOU CHECKED OFF ANY PROBLEMS ON THIS QUESTIONNAIRE, HOW DIFFICULT HAVE THESE PROBLEMS MADE IT FOR YOU TO DO YOUR WORK, TAKE CARE OF THINGS AT HOME, OR GET ALONG WITH OTHER PEOPLE: NOT DIFFICULT AT ALL
7. FEELING AFRAID AS IF SOMETHING AWFUL MIGHT HAPPEN: NOT AT ALL
4. TROUBLE RELAXING: NOT AT ALL
GAD7 TOTAL SCORE: 0

## 2023-02-02 ASSESSMENT — PATIENT HEALTH QUESTIONNAIRE - PHQ9
SUM OF ALL RESPONSES TO PHQ QUESTIONS 1-9: 0
SUM OF ALL RESPONSES TO PHQ QUESTIONS 1-9: 0

## 2023-02-02 ASSESSMENT — PAIN SCALES - GENERAL: PAINLEVEL: EXTREME PAIN (8)

## 2023-02-02 NOTE — PATIENT INSTRUCTIONS
Start steroid pack- finish completely.   Continue ibuprofen.  Consider 600 mg every 6-8 hours.   Can try flexeril 10 mg every 8 hours as needed.   Alternate heat and ice.  Recommend low back exercise- can look up online or YouTube.       Would recommend PT.   Let me know if you'd like referral    Cologuard kit will come to you in the mail.     I'd recommend a physical in the near future and check fasting labs.

## 2023-02-02 NOTE — PROGRESS NOTES
"  Assessment & Plan     Chronic bilateral low back pain with right-sided sciatica  Start medrol Dosepak and prn flexeril.  Continue ibuprofen.  Recommend starting heat, ice and low back exercises.  Declined PT referral today, but will call if he changes his mind.     - cyclobenzaprine (FLEXERIL) 10 MG tablet; Take 1 tablet (10 mg) by mouth 3 times daily as needed for muscle spasms  - methylPREDNISolone (MEDROL DOSEPAK) 4 MG tablet therapy pack; Follow Package Directions    Screen for colon cancer  No previous colon cancer screening, no family hx of colon cancer.   He would prefer Cologuard test.   He has not been in for a physical in many years, asked that he return in near future for that.     - COLOGUARD([employer]); Future       Nicotine/Tobacco Cessation:  He reports that he has been smoking cigarettes. He has been smoking an average of 1 pack per day. He has never used smokeless tobacco.  Nicotine/Tobacco Cessation Plan:   Information offered: Patient not interested at this time      BMI:   Estimated body mass index is 38.08 kg/m  as calculated from the following:    Height as of this encounter: 1.803 m (5' 11\").    Weight as of this encounter: 123.8 kg (273 lb).   Weight management plan: not discussed today        Return in about 4 weeks (around 3/2/2023) for Annual physical exam, Lab Work.    Jaquelin Chavez [facility] SHANTELL Avendano is a 57 year old, presenting for the following health issues:  Musculoskeletal Problem (Hip pain/) and Back Pain      Musculoskeletal Problem    Back Pain     History of Present Illness       Reason for visit:  Hip pain  Symptom onset:  More than a month  Symptoms include:  Pain  Symptom intensity:  Severe  Symptom progression:  Worsening  Had these symptoms before:  No  What makes it worse:  Lifting  What makes it better:  Sitting or laying down    He eats 0-1 servings of fruits and vegetables daily.He consumes 1 sweetened beverage(s) " "daily.He exercises with enough effort to increase his heart rate 9 or less minutes per day.  He exercises with enough effort to increase his heart rate 3 or less days per week.   He is taking medications regularly.    Today's PHQ-9         PHQ-9 Total Score: 0    PHQ-9 Q9 Thoughts of better off dead/self-harm past 2 weeks :   Not at all      Today's SALVADOR-7 Score: 0       Right hip pain for a few years, but recently worse.     Points to right buttock.   Hurts a lot to go from sitting to standing.    He is uncomfortable when he is moving around, but can deal with it.  Pain is worse when sitting for a prolonged period.     Small amount of radiation, occasionally shoots sharp down to his toe, this doesn't always happen.    Reports some numbness in his right toes.     Does not feel like his right leg is weak.     Has been taking ibuprofen 600 mg twice daily.  Not sure if it is helping much.       Discectomy over 10 years ago, low lumbar.         Review of Systems   Musculoskeletal: Positive for back pain.    ROS: 10 point ROS neg other than the symptoms noted above in the HPI and above patient documented.          Objective    /68   Pulse 68   Temp 97.4  F (36.3  C)   Ht 1.803 m (5' 11\")   Wt 123.8 kg (273 lb)   SpO2 97%   BMI 38.08 kg/m    Body mass index is 38.08 kg/m .  Physical Exam   GENERAL: healthy, alert and no distress  EYES: Eyes grossly normal to inspection, PERRL and conjunctivae and sclerae normal  RESP: breathing unlabored  MS: no gross musculoskeletal defects noted, no edema  SKIN: no suspicious lesions or rashes  NEURO: Normal strength and tone, mentation intact and speech normal  PSYCH: mentation appears normal, affect normal/bright    Labs reviewed in Epic.                 "

## 2023-02-13 ENCOUNTER — TELEPHONE (OUTPATIENT)
Dept: FAMILY MEDICINE | Facility: CLINIC | Age: 58
End: 2023-02-13
Payer: COMMERCIAL

## 2023-02-13 DIAGNOSIS — M54.41 CHRONIC BILATERAL LOW BACK PAIN WITH RIGHT-SIDED SCIATICA: Primary | ICD-10-CM

## 2023-02-13 DIAGNOSIS — G89.29 CHRONIC BILATERAL LOW BACK PAIN WITH RIGHT-SIDED SCIATICA: Primary | ICD-10-CM

## 2023-02-13 NOTE — TELEPHONE ENCOUNTER
FYI - Status Update    Who is Calling: patient    Update: Back pain is not any better. He would like to know what to do next.    Does caller want a call/response back: Yes     Okay to leave a detailed message?: Yes at Cell number on file:    Telephone Information:   Mobile [phone]       Maxine Hayden MA/SAY

## 2023-02-13 NOTE — TELEPHONE ENCOUNTER
Recommend physical therapy, referral placed.  He will be contacted to set that up. Jaquelin Chavez, CNP

## 2023-02-21 DIAGNOSIS — G89.29 CHRONIC BILATERAL LOW BACK PAIN WITH RIGHT-SIDED SCIATICA: ICD-10-CM

## 2023-02-21 DIAGNOSIS — M54.41 CHRONIC BILATERAL LOW BACK PAIN WITH RIGHT-SIDED SCIATICA: ICD-10-CM

## 2023-02-21 RX ORDER — CYCLOBENZAPRINE HCL 10 MG
10 TABLET ORAL 3 TIMES DAILY PRN
Qty: 30 TABLET | Refills: 0 | Status: SHIPPED | OUTPATIENT
Start: 2023-02-21 | End: 2023-07-24

## 2023-02-21 NOTE — TELEPHONE ENCOUNTER
Patient has been out of medication for 3 days, wondering if he should get a refill to last until physical therapy which is in March.        Anant Gonzalez

## 2023-03-01 ENCOUNTER — THERAPY VISIT (OUTPATIENT)
Dept: PHYSICAL THERAPY | Facility: CLINIC | Age: 58
End: 2023-03-01
Attending: NURSE PRACTITIONER
Payer: COMMERCIAL

## 2023-03-01 DIAGNOSIS — M25.551 HIP PAIN, RIGHT: ICD-10-CM

## 2023-03-01 DIAGNOSIS — G89.29 CHRONIC BILATERAL LOW BACK PAIN WITH RIGHT-SIDED SCIATICA: ICD-10-CM

## 2023-03-01 DIAGNOSIS — M54.41 CHRONIC BILATERAL LOW BACK PAIN WITH RIGHT-SIDED SCIATICA: ICD-10-CM

## 2023-03-01 PROCEDURE — 97161 PT EVAL LOW COMPLEX 20 MIN: CPT | Mod: GP

## 2023-03-01 NOTE — PROGRESS NOTES
Physical Therapy Initial Evaluation  Subjective:  The history is provided by the patient.   Patient Health History  Mason Durná being seen for back pain/R hip.     Problem began: 3/1/2013.   Problem occurred: lifting injury at work years ago   Pain is reported as 6/10 on pain scale.    Pertinent medical history includes: smoking.            Current medications:  Muscle relaxants.    Current occupation is .   Primary job tasks include:  Driving, prolonged standing, lifting/carrying, repetitive tasks and pushing/pulling.                  Therapist Generated HPI Evaluation  Problem details: Had work injury - caught a heavy piece of equipment and felt pop/zap - couldn't get out of bed the next day.      Patient could not sit long enough to eat meals 6 years ago - had disectomy which resolved symptoms.  Now patient cannot stand/walk.     Patient feels he doesn't lift his feet when he walks as much.    With current episode pt has had steroid and muscle relaxers - taking relaxors as needed..         Type of problem:  Lumbar (R HIP).      Condition occurred with:  Degenerative joint disease.        Pain radiates to:  Thigh right (Lateral right thigh).   Since onset symptoms are gradually worsening.  Associated with: R hip sensative to motion. Exacerbated by: Standing, walking, change of direction.  Carrying heavy objects.      Previous treatment includes surgery and physical therapy. There was none improvement following previous treatment.  Restrictions due to condition include:  Working in normal job without restrictions.        Oswestry Score: 22 %                 Objective:  System    Physical Exam    General     ROS      Objective:     Standing posture: fair  Movement Loss   Major Moderate Minimal Nil Pain   Flexion   x     Extension   x     Side Gliding R   x     Side Gliding L  x   R lateral trunk     Test Movements  During: Produces, abolishes, increases, decreases, nil, centralising,  "peripheralising  After: Better, worse, no better, no worse, no effect, centralised, peripheralised     Symptoms During Testing Symptoms After Testing   Pretest symptoms standing     FIS     Rep FIS     EIS     Rep EIS     Pretest symptoms lying     CASANDRA     Rep CASANDRA     EIL tightness    Rep EIL Reproduced R glute sx during No sx after     Other Special Tests  Neural tension: SLR (+) on R, slump (-) bilaterally      Dermatomes: not tested  Myotomes: WNL and 5/5  Hip Screen: SL stance x5\" ea, step up 6\" step x5 reps provoked R posterior glute pain.    Hip flexion to 90 degrees bilaterally, hip IR minimal loss and pain on R, hip ER WNL bilaterally, Hip extension min loss bilaterally.  PA joint mobility testing:  Grade IV glide reproduces R hip pain at L3-L5 levels.     Palpation:  Mild tenderness to palpation of R piriformis/posterior glute max fibers - does not reproduce pain.    Assessment/Plan:  Patient is a 57 year old male with lumbar complaints.      Juan Alberto presents with R posterior hip pain, with prominent history of lumbar conditions.  Assessment reveals suspicion for lumbar spine as most likely etiology of sx in R hip due to neural tension, symptoms with lumbar ROM, and provocation of sx with PA glide testing in lumbar spine.  Interventions and further assessment will assist in ruling out hip as possible source of patients symptoms.    Patient has the following significant findings with corresponding treatment plan.                Diagnosis 1:  Low back pain with referred pain to R posterior hip  Pain -  hot/cold therapy, manual therapy, self management, education, directional preference exercise and home program  Decreased ROM/flexibility - manual therapy, therapeutic exercise and home program  Decreased joint mobility - manual therapy, therapeutic exercise and home program  Decreased strength - therapeutic exercise, therapeutic activities and home program  Impaired balance - neuro re-education, therapeutic " activities and home program  Decreased proprioception - neuro re-education, therapeutic activities and home program  Impaired gait - gait training and home program  Impaired muscle performance - neuro re-education and home program  Decreased function - therapeutic activities and home program    Therapy Evaluation Codes:   Cumulative Therapy Evaluation is: Low complexity.    Previous and current functional limitations:  (See Goal Flow Sheet for this information)    Short term and Long term goals: (See Goal Flow Sheet for this information)     Communication ability:  Patient appears to be able to clearly communicate and understand verbal and written communication and follow directions correctly.  Treatment Explanation - The following has been discussed with the patient:   RX ordered/plan of care  Anticipated outcomes  Possible risks and side effects  This patient would benefit from PT intervention to resume normal activities.   Rehab potential is good.    Frequency:  1 X week, once daily  Duration:  for 8 weeks, tapered to 2x/month for 2 months  Discharge Plan:  Achieve all LTG.  Independent in home treatment program.  Reach maximal therapeutic benefit.    Please refer to the daily flowsheet for treatment today, total treatment time and time spent performing 1:1 timed codes.     Please refer to the daily flowsheet for treatment today, total treatment time and time spent performing 1:1 timed codes.

## 2023-03-08 ENCOUNTER — THERAPY VISIT (OUTPATIENT)
Dept: PHYSICAL THERAPY | Facility: CLINIC | Age: 58
End: 2023-03-08
Attending: NURSE PRACTITIONER
Payer: COMMERCIAL

## 2023-03-08 DIAGNOSIS — M25.551 HIP PAIN, RIGHT: Primary | ICD-10-CM

## 2023-03-08 PROCEDURE — 97112 NEUROMUSCULAR REEDUCATION: CPT | Mod: GP

## 2023-03-08 PROCEDURE — 97140 MANUAL THERAPY 1/> REGIONS: CPT | Mod: GP

## 2023-03-08 PROCEDURE — 97530 THERAPEUTIC ACTIVITIES: CPT | Mod: GP

## 2023-03-15 ENCOUNTER — THERAPY VISIT (OUTPATIENT)
Dept: PHYSICAL THERAPY | Facility: CLINIC | Age: 58
End: 2023-03-15
Attending: NURSE PRACTITIONER
Payer: COMMERCIAL

## 2023-03-15 DIAGNOSIS — M25.551 HIP PAIN, RIGHT: Primary | ICD-10-CM

## 2023-03-15 PROCEDURE — 97110 THERAPEUTIC EXERCISES: CPT | Mod: GP

## 2023-03-15 PROCEDURE — 97140 MANUAL THERAPY 1/> REGIONS: CPT | Mod: GP

## 2023-03-15 PROCEDURE — 97530 THERAPEUTIC ACTIVITIES: CPT | Mod: GP

## 2023-03-23 ENCOUNTER — THERAPY VISIT (OUTPATIENT)
Dept: PHYSICAL THERAPY | Facility: CLINIC | Age: 58
End: 2023-03-23
Payer: COMMERCIAL

## 2023-03-23 DIAGNOSIS — M25.551 HIP PAIN, RIGHT: Primary | ICD-10-CM

## 2023-03-23 PROCEDURE — 97140 MANUAL THERAPY 1/> REGIONS: CPT | Mod: GP

## 2023-03-23 PROCEDURE — 97110 THERAPEUTIC EXERCISES: CPT | Mod: GP

## 2023-03-29 ENCOUNTER — THERAPY VISIT (OUTPATIENT)
Dept: PHYSICAL THERAPY | Facility: CLINIC | Age: 58
End: 2023-03-29
Payer: COMMERCIAL

## 2023-03-29 DIAGNOSIS — M25.551 HIP PAIN, RIGHT: Primary | ICD-10-CM

## 2023-03-29 PROCEDURE — 97110 THERAPEUTIC EXERCISES: CPT | Mod: GP

## 2023-03-29 PROCEDURE — 97140 MANUAL THERAPY 1/> REGIONS: CPT | Mod: GP

## 2023-04-13 ENCOUNTER — THERAPY VISIT (OUTPATIENT)
Dept: PHYSICAL THERAPY | Facility: CLINIC | Age: 58
End: 2023-04-13
Payer: COMMERCIAL

## 2023-04-13 DIAGNOSIS — M25.551 HIP PAIN, RIGHT: Primary | ICD-10-CM

## 2023-04-13 PROCEDURE — 97112 NEUROMUSCULAR REEDUCATION: CPT | Mod: GP

## 2023-04-13 PROCEDURE — 97110 THERAPEUTIC EXERCISES: CPT | Mod: GP

## 2023-04-13 PROCEDURE — 97140 MANUAL THERAPY 1/> REGIONS: CPT | Mod: GP

## 2023-04-20 ENCOUNTER — THERAPY VISIT (OUTPATIENT)
Dept: PHYSICAL THERAPY | Facility: CLINIC | Age: 58
End: 2023-04-20
Payer: COMMERCIAL

## 2023-04-20 DIAGNOSIS — M25.551 HIP PAIN, RIGHT: Primary | ICD-10-CM

## 2023-04-20 PROCEDURE — 97140 MANUAL THERAPY 1/> REGIONS: CPT | Mod: GP

## 2023-04-20 PROCEDURE — 97110 THERAPEUTIC EXERCISES: CPT | Mod: GP

## 2023-04-20 PROCEDURE — 97112 NEUROMUSCULAR REEDUCATION: CPT | Mod: GP

## 2023-05-02 ENCOUNTER — PATIENT OUTREACH (OUTPATIENT)
Dept: FAMILY MEDICINE | Facility: CLINIC | Age: 58
End: 2023-05-02
Payer: COMMERCIAL

## 2023-05-04 ENCOUNTER — THERAPY VISIT (OUTPATIENT)
Dept: PHYSICAL THERAPY | Facility: CLINIC | Age: 58
End: 2023-05-04
Payer: COMMERCIAL

## 2023-05-04 DIAGNOSIS — M25.551 HIP PAIN, RIGHT: Primary | ICD-10-CM

## 2023-05-04 PROCEDURE — 97140 MANUAL THERAPY 1/> REGIONS: CPT | Mod: GP

## 2023-05-04 PROCEDURE — 97530 THERAPEUTIC ACTIVITIES: CPT | Mod: GP

## 2023-05-04 PROCEDURE — 97110 THERAPEUTIC EXERCISES: CPT | Mod: GP

## 2023-05-04 NOTE — PROGRESS NOTES
"Subjective:  HPI  Physical Exam                    Objective:  System    Physical Exam    General     ROS    Assessment/Plan:    PROGRESS  REPORT    Progress reporting period is from 3/1/23 to 5/4/23.       SUBJECTIVE  Subjective changes noted by patient:   (P) Juan Alberto reports low back is continuing to feel better, Juan Alberto feels about 50% better with low back since he began PT.  However R knee aggravated and feels swollen - denies any event of injury - anterior knee pain.    Current pain level is : 3/10.     Previous pain level was   5/10.   Changes in function:  Yes (See Goal flowsheet attached for changes in current functional level)  Adverse reaction to treatment or activity: None, activity - R knee exacerbated with unknown cause     OBJECTIVE  Changes noted in objective findings:  Yes  Objective: (P) superior patella circumference - 50 cm on R, 47 cm on L.  R knee flexion WNL and painfree - tenderness to palpation of lateral joint line.  Unable to perform squat due to knee pain but tolerated wall sits 2x60\" painfree     ASSESSMENT/PLAN  Updated problem list and treatment plan: Diagnosis 1:  Low back pain  Pain -  hot/cold therapy, manual therapy, education, directional preference exercise, and home program  Decreased ROM/flexibility - manual therapy, therapeutic exercise, therapeutic activity, and home program  Decreased joint mobility - manual therapy, therapeutic exercise, and home program  Decreased strength - therapeutic exercise, therapeutic activities, and home program  Impaired muscle performance - neuro re-education and home program  Decreased function - therapeutic activities and home program  STG/LTGs have been met or progress has been made towards goals:  Yes (See Goal flow sheet completed today.)  Assessment of Progress: The patient's condition is improving.  The patient's condition has potential to improve.  Patient is meeting short term goals and is progressing towards long term goals.  Self Management " Plans:  Patient has been instructed in a home treatment program.  Patient  has been instructed in self management of symptoms.  I have re-evaluated this patient and find that the nature, scope, duration and intensity of the therapy is appropriate for the medical condition of the patient.  Mason continues to require the following intervention to meet STG and LTG's:  PT    Recommendations:  This patient would benefit from continued therapy.     Frequency:  2 X a month, once daily  Duration:  for 2 months    Please refer to the daily flowsheet for treatment today, total treatment time and time spent performing 1:1 timed codes.

## 2023-06-14 ENCOUNTER — THERAPY VISIT (OUTPATIENT)
Dept: PHYSICAL THERAPY | Facility: CLINIC | Age: 58
End: 2023-06-14
Payer: COMMERCIAL

## 2023-06-14 DIAGNOSIS — M25.551 HIP PAIN, RIGHT: Primary | ICD-10-CM

## 2023-06-14 PROCEDURE — 97530 THERAPEUTIC ACTIVITIES: CPT | Mod: GP

## 2023-06-14 PROCEDURE — 97110 THERAPEUTIC EXERCISES: CPT | Mod: GP

## 2023-07-24 DIAGNOSIS — M54.41 CHRONIC BILATERAL LOW BACK PAIN WITH RIGHT-SIDED SCIATICA: ICD-10-CM

## 2023-07-24 DIAGNOSIS — G89.29 CHRONIC BILATERAL LOW BACK PAIN WITH RIGHT-SIDED SCIATICA: ICD-10-CM

## 2023-07-24 RX ORDER — CYCLOBENZAPRINE HCL 10 MG
10 TABLET ORAL 3 TIMES DAILY PRN
Qty: 30 TABLET | Refills: 0 | Status: SHIPPED | OUTPATIENT
Start: 2023-07-24 | End: 2023-08-11

## 2023-07-24 NOTE — TELEPHONE ENCOUNTER
Medication Question or Refill        What medication are you calling about (include dose and sig)?: cyclobenzaprine (FLEXERIL) 10 MG tablet     Preferred Pharmacy:  [id number] - [facility] [address]  [address] [address]  Phone: [phone] Fax: [phone]      Controlled Substance Agreement on file:   CSA -- Patient Level:    CSA: None found at the patient level.       Who prescribed the medication?: Samuel Anders      Do you need a refill? Yes    Patient offered an appointment? No    Do you have any questions or concerns?  No    Okay to leave a detailed message?: Yes at Home number on file [phone] (home)

## 2023-07-26 ENCOUNTER — THERAPY VISIT (OUTPATIENT)
Dept: PHYSICAL THERAPY | Facility: CLINIC | Age: 58
End: 2023-07-26
Payer: COMMERCIAL

## 2023-07-26 DIAGNOSIS — M25.551 HIP PAIN, RIGHT: Primary | ICD-10-CM

## 2023-07-26 PROCEDURE — 97010 HOT OR COLD PACKS THERAPY: CPT | Mod: GP

## 2023-07-26 PROCEDURE — 97110 THERAPEUTIC EXERCISES: CPT | Mod: GP

## 2023-07-26 PROCEDURE — 97140 MANUAL THERAPY 1/> REGIONS: CPT | Mod: GP

## 2023-07-26 NOTE — PROGRESS NOTES
PLAN  Continue therapy per current plan of care.    Juan Alberto was independent with HEP for last two months, however had a flare up this weekend in which his home program was regressed to adapt to current state, and he was recommended to see sports medicine provider based on current symptom intensity.  Planning to add two additional visits to plan to continue to progress Juan Alberto from his current status    Beginning/End Dates of Progress Note Reporting Period:  07/26/23 to 07/26/2023    Referring Provider:  Jaquelin Chavez     07/26/23 0500   Appointment Info   Signing clinician's name / credentials Ayan Kramer DPT   Total/Authorized Visits 12( E&T)   Visits Used 10   Medical Diagnosis Low back pain with referred pain to R hip   Progress Note/Certification   Therapy Frequency 2x/month   Predicted Duration 2 months   Progress Note Due Date 09/23/23   Progress Note Completed Date 07/26/23   GOALS   PT Goals 2   PT Goal 1   Goal Description standing at work for 2+ hours with 2/10 pain or less in back and/or R hip   Rationale to maximize safety and independence with performance of ADLs and functional tasks;to maximize safety and independence within the home;to maximize safety and independence with transportation;to maximize safety and independence with self cares;to maximize safety and independence within the community  (to perform work duties)   Goal Progress 7/10 pain at all times, radicular pain into R LE and foot constant   Target Date 07/12/23   PT Goal 2   Goal Description Oswestry 5%   Rationale to maximize safety and independence within the community;to maximize safety and independence within the home;to maximize safety and independence with performance of ADLs and functional tasks;to maximize safety and independence with transportation;to maximize safety and independence with self cares   Target Date 07/12/23   Subjective Report   Subjective Report Juan Alberto returns again after 6 weeks of independent  "management.  He reports exacerbation occured last week in which his R radicular symptoms are the worst hes had down to the foot.  Denies red flag signs relating to cauda equina.   Objective Measures   Objective Measures Objective Measure 2   Objective Measure 1   Objective Measure GRANT   Details tolerates Sara x5', and supine SKTC   PT Modalities   PT Modalities Hot Packs   Hot Pack   Hot Pack Minutes (57888) 10   Patient Response/Progress subjective reports of small improvement in R foot pain   Treatment Detail with Sara and prone lying alternating x10' (1' each)   Treatment Interventions (PT)   Interventions Therapeutic Procedure/Exercise;Therapeutic Activity;Manual Therapy   Therapeutic Procedure/Exercise   Therapeutic Procedures: strength, endurance, ROM, flexibillity minutes (40767) 20   PTRx Ther Proc 1 Prone On Elbows   PTRx Ther Proc 1 - Details x5' total - alternated with prone lying x10' (1' each)   PTRx Ther Proc 2 Hip Extension Straight Leg Raise   PTRx Ther Proc 2 - Details x10 each - raise 0.5\" off table   PTRx Ther Proc 3 Posterior Pelvic Tilt   PTRx Ther Proc 3 - Details x10 cycles ant/posterior tilting in hooklying   PTRx Ther Proc 4 Single Knee to Chest   PTRx Ther Proc 4 - Details with strap passively x10 each   PTRx Ther Proc 5 Supine Lumbar Hip Roll   PTRx Ther Proc 5 - Details pillow between knees x10 each way   Patient Response/Progress tolerated all interventions well, nil improvement in radicular pain   Neuromuscular Re-education   PTRx Neuro Re-ed 2 Proprioception At Counter Weight Shift   Manual Therapy   Manual Therapy: Mobilization, MFR, MLD, friction massage minutes (80830) 15   Manual Therapy Manual Therapy 2   Manual Therapy 1 10 lumbar traction   Manual Therapy 1 - Details with feet on physioball using gait belt - x10' sustained   Patient Response/Progress traction resulted in mild relief of LBP during   Manual Therapy 2 STM x2' bilateral lumbarparaspinals for pain relief   Manual " Therapy 2 - Details PA mobs grade I x3' for pain relief   Education   Education Comments education on seeking sports medicine consult based on pain levels today   Plan   Updates to plan of care regressed entire HEP to acute flare up   Plan for next session progress ROM as tolerated   Total Session Time   Timed Code Treatment Minutes 35   Total Treatment Time (sum of timed and untimed services) 45

## 2023-07-26 NOTE — TELEPHONE ENCOUNTER
SPINE PATIENTS - NEW PROTOCOL PREVISIT    RECORDS RECEIVED FROM: Internal   REASON FOR VISIT: Disectomy 2007 L5 -S1 and DDD, sports med said not a candidate for them, Sciatica going on    Date of Appt: 08/23/2023   NOTES (FOR ALL VISITS) STATUS DETAILS   OFFICE NOTE from referring provider N/A    OFFICE NOTE from other specialist Internal 02/02/2023 Dr Chavez [facility]   03/10/2014 PT [facility]    DISCHARGE SUMMARY from hospital N/A    DISCHARGE REPORT from ER N/A    OPERATIVE REPORT Care Everywhere 08/24/2007 LAMINECTOMY DECOMPRESSION POSTERIOR LEVEL 01 MINIMALLY INVASIVE   EMG REPORT N/A    MEDICATION LIST N/A    IMAGING  (FOR ALL VISITS)     MRI (HEAD, NECK, SPINE) N/A    XRAY (SPINE) *NEUROSURGERY* N/A    CT (HEAD, NECK, SPINE) N/A

## 2023-07-31 ENCOUNTER — TELEPHONE (OUTPATIENT)
Dept: FAMILY MEDICINE | Facility: CLINIC | Age: 58
End: 2023-07-31

## 2023-07-31 NOTE — TELEPHONE ENCOUNTER
Reason for Call:  Appointment Request    Patient requesting this type of appt:  OV    Requested provider:  Any available    Reason patient unable to be scheduled: Not within requested timeframe    When does patient want to be seen/preferred time: 1-2 days    Comments: sciatica pain    Okay to leave a detailed message?: Yes at Cell number on file:    Telephone Information:   Mobile [phone]       Call taken on 7/31/2023 at 8:37 AM by Hien Warner

## 2023-08-03 ENCOUNTER — OFFICE VISIT (OUTPATIENT)
Dept: FAMILY MEDICINE | Facility: CLINIC | Age: 58
End: 2023-08-03
Payer: COMMERCIAL

## 2023-08-03 VITALS
BODY MASS INDEX: 38.22 KG/M2 | TEMPERATURE: 97.7 F | WEIGHT: 273 LBS | SYSTOLIC BLOOD PRESSURE: 135 MMHG | OXYGEN SATURATION: 96 % | HEIGHT: 71 IN | HEART RATE: 94 BPM | DIASTOLIC BLOOD PRESSURE: 83 MMHG

## 2023-08-03 DIAGNOSIS — M54.41 CHRONIC RIGHT-SIDED LOW BACK PAIN WITH RIGHT-SIDED SCIATICA: Primary | ICD-10-CM

## 2023-08-03 DIAGNOSIS — G89.29 CHRONIC RIGHT-SIDED LOW BACK PAIN WITH RIGHT-SIDED SCIATICA: Primary | ICD-10-CM

## 2023-08-03 PROCEDURE — 99214 OFFICE O/P EST MOD 30 MIN: CPT | Performed by: NURSE PRACTITIONER

## 2023-08-03 RX ORDER — METHYLPREDNISOLONE 4 MG
TABLET, DOSE PACK ORAL
Qty: 21 TABLET | Refills: 0 | Status: SHIPPED | OUTPATIENT
Start: 2023-08-03 | End: 2023-08-23

## 2023-08-03 RX ORDER — GABAPENTIN 100 MG/1
100 CAPSULE ORAL 3 TIMES DAILY
Qty: 90 CAPSULE | Refills: 0 | Status: SHIPPED | OUTPATIENT
Start: 2023-08-03 | End: 2023-08-23

## 2023-08-03 ASSESSMENT — ENCOUNTER SYMPTOMS: BACK PAIN: 1

## 2023-08-03 ASSESSMENT — PAIN SCALES - GENERAL: PAINLEVEL: SEVERE PAIN (6)

## 2023-08-03 NOTE — PATIENT INSTRUCTIONS
Start steroid pack.  Can continue flexeril as needed- let me know if you need a refill.   Try gabapentin 100 mg three times daily, used for nerve pain.  Start this tonight before bed.  See how you tolerate it.  If you are tolerating it without bothersome side effects, then go ahead and start taking it twice daily,  after that increase to three times daily.   We can increase dosage if needed.    Continue PT.   Order for MRI.    Keep appointment with neurosurgery.     To ER with severe pain, loss of bowel or bladder or inability to urinate.

## 2023-08-03 NOTE — PROGRESS NOTES
"  Assessment & Plan     Chronic right-sided low back pain with right-sided sciatica  Not improved with several months of PT. States now the worse it has ever been.    He is scheduled to see neurosurgery on 8/23.  Will order MRI, pt will call to schedule.   Start medrol Dosepak.   Trial of gabapentin to see if this helps with pain.   Can continue flexeril as needed.   He plans to continue PT, has another appointment next week.     Discussed symptoms that would warrant and urgent evaluation.   - MR LUMBAR SPINE W/O & W CONTRAST; Future  - methylPREDNISolone (MEDROL DOSEPAK) 4 MG tablet therapy pack; Follow Package Directions  - gabapentin (NEURONTIN) 100 MG capsule; Take 1 capsule (100 mg) by mouth 3 times daily         BMI:   Estimated body mass index is 38.08 kg/m  as calculated from the following:    Height as of this encounter: 1.803 m (5' 11\").    Weight as of this encounter: 123.8 kg (273 lb).       Jaquelin L. Kathy, [facility]   Juan Alberto is a 57 year old, presenting for the following health issues:  Back Pain      8/3/2023     8:41 AM   Additional Questions   Roomed by ameena   Accompanied by self       Back Pain     History of Present Illness       Back Pain:  He presents for follow up of back pain. Patient's back pain is a recurring problem.  Location of back pain:  Right lower back, right buttock, right hip and right side of waist  Description of back pain: burning and shooting  Back pain spreads: right buttocks, right thigh, right knee and right foot    Since patient first noticed back pain, pain is: rapidly worsening  Does back pain interfere with his job:  Yes       He eats 0-1 servings of fruits and vegetables daily.He consumes 1 sweetened beverage(s) daily.He exercises with enough effort to increase his heart rate 9 or less minutes per day.  He exercises with enough effort to increase his heart rate 3 or less days per week.   He is taking medications regularly.   " "      On-going back pain.  First seen in February 2023.  Did several sessions of PT.   He did have some improvement but not full resolution.   About 3 weeks ago, pain became much worse.  Pain is right low back, radiates all the way down the leg to foot.  Even skis skin is painful to the touch.  Having a hard time sleeping.    Seeing neurosurgery the end of this month.     Has some difficulty initiating urine stream, but is able to go, he is not sure if this is new.   No saddle anesthesia.  No loss of bowel or bladder.   Discectomy in 2007, L5-S1.    For pain- taking ibuprofen and flexeril, not helping.     Still going to PT.        Review of Systems   Musculoskeletal:  Positive for back pain.    ROS: 10 point ROS neg other than the symptoms noted above in the HPI and above patient answer.          Objective    /83   Pulse 94   Temp 97.7  F (36.5  C)   Ht 1.803 m (5' 11\")   Wt 123.8 kg (273 lb)   SpO2 96%   BMI 38.08 kg/m    Body mass index is 38.08 kg/m .  Physical Exam   GENERAL: healthy, alert and no distress.  Has to stand to improve pain after sitting for a while.   EYES: Eyes grossly normal to inspection, PERRL and conjunctivae and sclerae normal  RESP: Breathing unlabored  SKIN: no suspicious lesions or rashes  NEURO: Normal strength and tone, mentation intact and speech normal  PSYCH: mentation appears normal, affect normal/bright                "

## 2023-08-10 ENCOUNTER — THERAPY VISIT (OUTPATIENT)
Dept: PHYSICAL THERAPY | Facility: CLINIC | Age: 58
End: 2023-08-10
Payer: COMMERCIAL

## 2023-08-10 ENCOUNTER — ANCILLARY PROCEDURE (OUTPATIENT)
Dept: MRI IMAGING | Facility: CLINIC | Age: 58
End: 2023-08-10
Attending: NURSE PRACTITIONER
Payer: COMMERCIAL

## 2023-08-10 DIAGNOSIS — M25.551 HIP PAIN, RIGHT: Primary | ICD-10-CM

## 2023-08-10 DIAGNOSIS — M54.41 CHRONIC RIGHT-SIDED LOW BACK PAIN WITH RIGHT-SIDED SCIATICA: ICD-10-CM

## 2023-08-10 DIAGNOSIS — G89.29 CHRONIC RIGHT-SIDED LOW BACK PAIN WITH RIGHT-SIDED SCIATICA: ICD-10-CM

## 2023-08-10 PROCEDURE — 97140 MANUAL THERAPY 1/> REGIONS: CPT | Mod: GP

## 2023-08-10 PROCEDURE — 97110 THERAPEUTIC EXERCISES: CPT | Mod: GP

## 2023-08-10 PROCEDURE — 72158 MRI LUMBAR SPINE W/O & W/DYE: CPT | Mod: TC | Performed by: RADIOLOGY

## 2023-08-10 PROCEDURE — A9585 GADOBUTROL INJECTION: HCPCS | Performed by: RADIOLOGY

## 2023-08-10 RX ORDER — GADOBUTROL 604.72 MG/ML
15 INJECTION INTRAVENOUS ONCE
Status: COMPLETED | OUTPATIENT
Start: 2023-08-10 | End: 2023-08-10

## 2023-08-10 RX ADMIN — GADOBUTROL 12.5 ML: 604.72 INJECTION INTRAVENOUS at 09:50

## 2023-08-11 ENCOUNTER — TELEPHONE (OUTPATIENT)
Dept: FAMILY MEDICINE | Facility: CLINIC | Age: 58
End: 2023-08-11
Payer: COMMERCIAL

## 2023-08-11 DIAGNOSIS — G89.29 CHRONIC BILATERAL LOW BACK PAIN WITH RIGHT-SIDED SCIATICA: ICD-10-CM

## 2023-08-11 DIAGNOSIS — M54.41 CHRONIC BILATERAL LOW BACK PAIN WITH RIGHT-SIDED SCIATICA: ICD-10-CM

## 2023-08-11 RX ORDER — HYDROCODONE BITARTRATE AND ACETAMINOPHEN 5; 325 MG/1; MG/1
1-2 TABLET ORAL
Qty: 20 TABLET | Refills: 0 | Status: SHIPPED | OUTPATIENT
Start: 2023-08-11 | End: 2023-08-23

## 2023-08-11 RX ORDER — CYCLOBENZAPRINE HCL 10 MG
10 TABLET ORAL 3 TIMES DAILY PRN
Qty: 30 TABLET | Refills: 0 | Status: SHIPPED | OUTPATIENT
Start: 2023-08-11 | End: 2023-08-23

## 2023-08-11 NOTE — TELEPHONE ENCOUNTER
I sent in a prescription for Norco.  This is an opioid pain pill.  Instructions are 1-2 pills before bed.  Hopefully this will help with pain at night so he can get some sleep. Should not take during the day if needing to drive and/or go to work. Medicine could cause sedation.  Do not mix with alcohol or other sedating medications, like the gabapentin.  If he wants, we could try a higher dose of gabapentin for just during the day.    If he develops severe pain, inability to walk or loss of bowel/bladder, should be seen in ER.  Thanks, Jaquelin Chavez, CNP     
Patient calling with update re: sciatic pain. Started on gabapentin 3x per day. Pt thought in the beginning pain was improving with gabapentin, but is no longer helping.    Yesterday had MRI done, physical therapy, and went to work. Nerve pain flared up. Pt can't sleep from the pain.     Pt ran out of flexeril 2 days ago, states in the beginning it seemed to help, but the more he took it the less it was improving. Ibuprofen also doesn't work.     Pt states pain has stayed the same in the last week, ebbs and flows. Has days where pain peaks and he can't roll over in bed. If he lays down he can't get comfortable and when he stands he can't feel his right foot, feels numb.     Patient is looking for some solution/relief to get him until he can see the neurosurgeon.     Jaquelin Hameed RN    [facility]- [facility]    
Patient notified of provider's message as written below. Patient verbalized good understanding, had no further questions and needed no further support.Kristen Romano R.N.    
jaw

## 2023-08-16 NOTE — PROGRESS NOTES
"    SUBJECTIVE:  HPI:  Mason Durán  Is a 57 year old male who presents today for new patient evaluation of low back pain upon referral from Dr. Jaquelin Chavez, whose 8/3/2023 office note documents chronic right-sided low back and hip pain, ongoing despite several months of PT.  Sports medicine declined to see him.  Medrol Dosepak and gabapentin 100 mg 3 times daily trial and continue Flexeril ordered.  Continued PT and lumbar MRI ordered.      History today:  He is here with his wife Radha assisting with the history.  Juan Alberto stopped all of his medications about 3 to 4 days ago with only a minor uptick in his symptoms.  Prior to that time he had significant constipation which has resolved.    He started having low back pain in the right SI area and down into the thigh and calf without any inciting event about 7 to 8 months ago.  PT has not helped.  He does not have any change in his old chronic right so numbness and toe numbness ever since his lumbar surgery which she remembers being on the right side in 2007.  He does not trust his right leg but does not describe any specific weakness.  When he puts weight on the right leg it hurts in the right SI area and as result he walks with a slight limp when bearing weight on the right leg.  He denies any bowel or bladder dysfunction, saddle anesthesia, or sexual dysfunction.  No other numbness in the right lower extremity.  He described some transient allodynia in the right leg and in the anterior pelvic area but that has resolved.  He has been working with Ayan in the [facility] [facility] doing PT and initially it helped but some of the HEP exercises make it worse.  In the last month he has been \"snagging\" his right toe at work suggesting a subtle foot drop.    SYMPTOMS WORSENED WITH long sitting, lifting    SYMPTOMS IMPROVED WITH standing    Pain score and diagram reviewed.  See questionnaire.      ROS:.  Otherwise negative for bowel/bladder dysfunction, balance " changes, headache, leg pain/numbness/weakness, fevers, chills, night sweats, unexplained weight loss;  otherwise unremarkable.   See the patient's intake questionnaire from today for details.    Treatment to Date: Medications, PT    MEDICATIONS:  Reviewed.    ALLERGIES:  Reviewed.     Past medical and surgical history:   Pertinent for obesity,  and history of Prior single level lumbar decompression 8/24/2007 ( right L5-S1?)    SOCIAL HX: He is  and they have 2 children but no grandchildren.  He works as a  lifting up to 40 pounds but not a lot of bending stooping and twisting.  Sports hobbies and activities: Fishing and hunting    OBJECTIVE:    IMAGING: Images and reports reviewed.    MRI LUMBAR SPINE WITHOUT AND WITH CONTRAST  8/10/2023    COMPARISON: Lumbar spine x-ray 12/27/2013.      L3-L4: No loss of disc height. Small right foraminal disc protrusion  with right uncinate spurring. Normal facets. No spinal canal  narrowing. No significant right neural foraminal narrowing. No left  neural foraminal narrowing.     L4-L5: Mild loss of disc height and signal. Left foraminal disc bulge  with uncinate spurring. Mild facet hypertrophy. No spinal canal  narrowing. No right neural foraminal narrowing. Mild left neural  foraminal narrowing.      L5-S1: Moderate loss of disc height and signal with degenerative  endplate changes. Posterior disc bulge with endplate osteophytic  spurring. Bilateral facet hypertrophy. No spinal canal narrowing. Mild  right neural foraminal narrowing. Mild left neural foraminal  narrowing.                                                                   IMPRESSION:    1. Degenerative changes in the lower lumbar spine as detailed above,  most pronounced at L5-S1.  2. Mild neural foraminal narrowings bilaterally at L5-S1 and on the  left at L4-L5.  3. No spinal canal narrowing at any level.   4. Endplate edema asymmetric towards right at L5-S1. This finding has  been associated  with a source of pain in some patients.  (OM-no comment regarding past surgery which appears to be right L5-S1 laminectomy)    EXAMINATION:    --CONSTITUTIONAL:   No acute distress.  The patient is well nourished and well groomed.  Transitions without pushoff.  BMI is elevated.  --SKIN:  Skin over the face, bilateral lower extremities, and posterior torso is clean, dry, intact without rashes.  Low midline lumbar scar at the L5-S1 level-nonindurated and nontender.  --GAIT:  is non-antalgic. Flat foot, and toe walking:  normal   he does appear to have a right foot drop with heel walking.  Squat and rise   normal    .  --STANDING EXAMINATION:    Symmetry of spine/pelvis   unremarkable   .      Range of motion limited in forward flexion hands to mid shins accompanied by pain in the shin on the right.  Full in extension with some pain.  Right sidebending painful, left sidebending and rotation painless.  Negative Kemps test although extension with right sidebending brought on significant pain  Standing flexion   negative   .    Siobhan's sign   negative    .     Stork test   negative    .   --NEUROLOGICAL:     ROMBERG, TANDEM WALK, PRONATOR DRIFT:   Normal.   .  SENSATION to light touch is decreased in the sole of the right foot but otherwise intact in bilateral thighs, lower legs and feet.   REFLEXES:  patellar absent, and achilles absent.  Babinski is negative. No clonus.  MANUAL MOTOR TESTING:  L3- S1 Myotomes, Femoral, Obturator, Peroneal and Tibial nerves 5/5 specifically no EHL or anterior tibialis weakness  DURAL STRETCH TESTS:  SLR negative however on the right he gets right SI pain.  Femoral Stretch Test not done.   --PELVIC/HIP JOINTS:                Long Sitting      short to long right.    Hip scour   negative   .    Hip Impingement     slightly positive right IR.   JOHN    medically positive right for SI and gluteal pain.     Piriformis   negative   .   Spring testing only positive right SI negative left SI  and lumbar.      PELVIC ALIGNMENT posterior innominate rotation.   --LUMBAR/GLUTEAL MUSCLES: Tender in the right glutes without radiating pain down the leg.    --ABDOMINAL:  Non-distended.  Nontender  --VASCULAR: Femoral pulses nonpalpable. Lower extremity capillary refill, temperature and color normal.       Procedure note-OMT:  New medicine restored normal pelvic alignment.  Leg length discrepancy half a centimeter short on the right unmasked but negative long sitting test.  JOHN testing and putting on his right shoe much less uncomfortable.  Lumbar range of motion was to ankles in flexion and much less painful with right Kemps test not painful with extension.  He still had a subtle right foot drop.  SLR became negative on the right.  He found it much easier to sit afterwards.    ASSESSMENT: Mason Durán is a 57 year old male who presents  today for new patient evaluation of:    Subacute low back pain for 7 to 8 months  Pelvic Joint Dysfunction manifesting as a right posterior innominate rotation  Subtle right foot drop possibly from on leveling of the pelvis with subsequent right L5 impingement narrowed foramen      PLAN:  Switch to Ayan Almaraz for pelvic stabilization PT here and follow-up with me in 2 months.  Do some easy walking and do the self-correction he was taught today each morning.    Advised patient to call or return early if symptoms worsen, or having problems controlling bladder and bowel function or worsening leg weakness.     Please note: Voice recognition software was used in this dictation.  It may therefore contain typographical errors.    Rodriguez García MD

## 2023-08-23 ENCOUNTER — PRE VISIT (OUTPATIENT)
Dept: NEUROSURGERY | Facility: CLINIC | Age: 58
End: 2023-08-23

## 2023-08-23 ENCOUNTER — OFFICE VISIT (OUTPATIENT)
Dept: NEUROSURGERY | Facility: CLINIC | Age: 58
End: 2023-08-23
Payer: COMMERCIAL

## 2023-08-23 VITALS
WEIGHT: 266.7 LBS | HEART RATE: 77 BPM | BODY MASS INDEX: 37.2 KG/M2 | DIASTOLIC BLOOD PRESSURE: 88 MMHG | SYSTOLIC BLOOD PRESSURE: 137 MMHG

## 2023-08-23 DIAGNOSIS — M99.04 SACROILIAC JOINT SOMATIC DYSFUNCTION: Primary | ICD-10-CM

## 2023-08-23 DIAGNOSIS — M79.18 MYOFASCIAL PAIN: ICD-10-CM

## 2023-08-23 PROCEDURE — 98925 OSTEOPATH MANJ 1-2 REGIONS: CPT | Performed by: PREVENTIVE MEDICINE

## 2023-08-23 PROCEDURE — 99204 OFFICE O/P NEW MOD 45 MIN: CPT | Mod: 25 | Performed by: PREVENTIVE MEDICINE

## 2023-08-23 NOTE — PATIENT INSTRUCTIONS
"Juan Alberto you had a Pelvic Joint Dysfunction response to treatment today is very encouraging.  We will get a switch you to a different kind of PT and I am confident you will feel a lot better.  Do some easy walking to help receipt the pelvis and the self-correction I taught you each morning as described below.  I do not think we need to worry about injections or surgery here and I think your foot drop will improve.  If the foot drop gets worse I want to see you sooner otherwise I will look forward to seeing you here in 2 months    ASSESSMENT: Mason Durán is a 57 year old male who presents  today for new patient evaluation of:    Subacute low back pain for 7 to 8 months  Pelvic Joint Dysfunction manifesting as a right posterior innominate rotation  Subtle right foot drop possibly from on leveling of the pelvis with subsequent right L5 impingement narrowed foramen      PLAN:  Switch to Ayan Almaraz for pelvic stabilization PT here and follow-up with me in 2 months.  Do some easy walking and do the self-correction he was taught today each morning.        PELVIC JOINT SELF CORRECTION EXERCISES      It is best to do this first thing in the morning, and can be repeated once or twice during the day.    \"SHOTGUN\" TECHNIQUE:  This loosens up the front and back of the pelvis.  Do this before the Broomstick exercise.  Use on object such as a rectangular laundry basket.  Lie on your back with your knees bent, feet together and flat on the floor.    Spread your knees approximately 12-24 inches around the outside of an upright laundry basket.  Squeeze your knees together, breathing as you do this.    Concentrate on keeping your buttocks relaxed and on the ground.    A brief discomfort in the front of the pelvis and even a popping sound is normal.  Hold the squeeze for 3-5 seconds.    Relax for 3-5 seconds.    Repeat 2 more times.    Now reverse your knee position to the inside of the upside down laundry basket while still lying " with your knees bent up, feet on the ground.  Pull your knees apart, breathing easy as you do this.  Hold the squeeze for 3-5 seconds.    Relax for 3-5 seconds.    Repeat 2 more times.    BROOMSTICK EXERCISE:  Lie on your back with your knees bent.  Slide a broomstick or similar object above one knee, and below the opposite knee.  Firmly hold the stick with your hands will bringing your knees closed to your belly, preferably high enough that your back can rest flat on the ground.  Still holding the stick, scissors your legs against the stick, breathing as you do this.    (Push down to your foot with leg on top of the broomstick, and lift up to your chin with the leg below the broomstick).  Hold the squeeze for 3-5 seconds.    Relax for 3-5 seconds.    Repeat 2 more times.  Switch the position of the broomstick above the other knee, and below the first knee.  Repeat the exercise as above in this new position.

## 2023-08-23 NOTE — LETTER
8/23/2023         RE: Mason Durán  [id number] [facility] [id number]        Dear Colleague,    Thank you for referring your patient, Mason Durán, to the [facility] [place]. Please see a copy of my visit note below.        SUBJECTIVE:  HPI:  Mason Durán  Is a 57 year old male who presents today for new patient evaluation of low back pain upon referral from Dr. Jaquelin Chavez, whose 8/3/2023 office note documents chronic right-sided low back and hip pain, ongoing despite several months of PT.  Sports medicine declined to see him.  Medrol Dosepak and gabapentin 100 mg 3 times daily trial and continue Flexeril ordered.  Continued PT and lumbar MRI ordered.      History today:  He is here with his wife Radha assisting with the history.  Juan Alberto stopped all of his medications about 3 to 4 days ago with only a minor uptick in his symptoms.  Prior to that time he had significant constipation which has resolved.    He started having low back pain in the right SI area and down into the thigh and calf without any inciting event about 7 to 8 months ago.  PT has not helped.  He does not have any change in his old chronic right so numbness and toe numbness ever since his lumbar surgery which she remembers being on the right side in 2007.  He does not trust his right leg but does not describe any specific weakness.  When he puts weight on the right leg it hurts in the right SI area and as result he walks with a slight limp when bearing weight on the right leg.  He denies any bowel or bladder dysfunction, saddle anesthesia, or sexual dysfunction.  No other numbness in the right lower extremity.  He described some transient allodynia in the right leg and in the anterior pelvic area but that has resolved.  He has been working with Ayan in the [employer] center doing PT and initially it helped but some of the HEP exercises make it worse.  In the last month he has been  "\"snagging\" his right toe at work suggesting a subtle foot drop.    SYMPTOMS WORSENED WITH long sitting, lifting    SYMPTOMS IMPROVED WITH standing    Pain score and diagram reviewed.  See questionnaire.      ROS:.  Otherwise negative for bowel/bladder dysfunction, balance changes, headache, leg pain/numbness/weakness, fevers, chills, night sweats, unexplained weight loss;  otherwise unremarkable.   See the patient's intake questionnaire from today for details.    Treatment to Date: Medications, PT    MEDICATIONS:  Reviewed.    ALLERGIES:  Reviewed.     Past medical and surgical history:   Pertinent for obesity,  and history of Prior single level lumbar decompression 8/24/2007 ( right L5-S1?)    SOCIAL HX: He is  and they have 2 children but no grandchildren.  He works as a  lifting up to 40 pounds but not a lot of bending stooping and twisting.  Sports hobbies and activities: Fishing and hunting    OBJECTIVE:    IMAGING: Images and reports reviewed.    MRI LUMBAR SPINE WITHOUT AND WITH CONTRAST  8/10/2023    COMPARISON: Lumbar spine x-ray 12/27/2013.      L3-L4: No loss of disc height. Small right foraminal disc protrusion  with right uncinate spurring. Normal facets. No spinal canal  narrowing. No significant right neural foraminal narrowing. No left  neural foraminal narrowing.     L4-L5: Mild loss of disc height and signal. Left foraminal disc bulge  with uncinate spurring. Mild facet hypertrophy. No spinal canal  narrowing. No right neural foraminal narrowing. Mild left neural  foraminal narrowing.      L5-S1: Moderate loss of disc height and signal with degenerative  endplate changes. Posterior disc bulge with endplate osteophytic  spurring. Bilateral facet hypertrophy. No spinal canal narrowing. Mild  right neural foraminal narrowing. Mild left neural foraminal  narrowing.                                                                   IMPRESSION:    1. Degenerative changes in the lower " lumbar spine as detailed above,  most pronounced at L5-S1.  2. Mild neural foraminal narrowings bilaterally at L5-S1 and on the  left at L4-L5.  3. No spinal canal narrowing at any level.   4. Endplate edema asymmetric towards right at L5-S1. This finding has  been associated with a source of pain in some patients.  (OM-no comment regarding past surgery which appears to be right L5-S1 laminectomy)    EXAMINATION:    --CONSTITUTIONAL:   No acute distress.  The patient is well nourished and well groomed.  Transitions without pushoff.  BMI is elevated.  --SKIN:  Skin over the face, bilateral lower extremities, and posterior torso is clean, dry, intact without rashes.  Low midline lumbar scar at the L5-S1 level-nonindurated and nontender.  --GAIT:  is non-antalgic. Flat foot, and toe walking:  normal   he does appear to have a right foot drop with heel walking.  Squat and rise   normal    .  --STANDING EXAMINATION:    Symmetry of spine/pelvis   unremarkable   .      Range of motion limited in forward flexion hands to mid shins accompanied by pain in the shin on the right.  Full in extension with some pain.  Right sidebending painful, left sidebending and rotation painless.  Negative Kemps test although extension with right sidebending brought on significant pain  Standing flexion   negative   .    Siobhan's sign   negative    .     Stork test   negative    .   --NEUROLOGICAL:     ROMBERG, TANDEM WALK, PRONATOR DRIFT:   Normal.   .  SENSATION to light touch is decreased in the sole of the right foot but otherwise intact in bilateral thighs, lower legs and feet.   REFLEXES:  patellar absent, and achilles absent.  Babinski is negative. No clonus.  MANUAL MOTOR TESTING:  L3- S1 Myotomes, Femoral, Obturator, Peroneal and Tibial nerves 5/5 specifically no EHL or anterior tibialis weakness  DURAL STRETCH TESTS:  SLR negative however on the right he gets right SI pain.  Femoral Stretch Test not done.   --PELVIC/HIP JOINTS:                 Long Sitting      short to long right.    Hip scour   negative   .    Hip Impingement     slightly positive right IR.   JOHN    medically positive right for SI and gluteal pain.     Piriformis   negative   .   Spring testing only positive right SI negative left SI and lumbar.      PELVIC ALIGNMENT posterior innominate rotation.   --LUMBAR/GLUTEAL MUSCLES: Tender in the right glutes without radiating pain down the leg.    --ABDOMINAL:  Non-distended.  Nontender  --VASCULAR: Femoral pulses nonpalpable. Lower extremity capillary refill, temperature and color normal.       Procedure note-OMT:  New medicine restored normal pelvic alignment.  Leg length discrepancy half a centimeter short on the right unmasked but negative long sitting test.  JOHN testing and putting on his right shoe much less uncomfortable.  Lumbar range of motion was to ankles in flexion and much less painful with right Kemps test not painful with extension.  He still had a subtle right foot drop.  SLR became negative on the right.  He found it much easier to sit afterwards.    ASSESSMENT: Mason Durán is a 57 year old male who presents  today for new patient evaluation of:    Subacute low back pain for 7 to 8 months  Pelvic Joint Dysfunction manifesting as a right posterior innominate rotation  Subtle right foot drop possibly from on leveling of the pelvis with subsequent right L5 impingement narrowed foramen      PLAN:  Switch to Ayan Almaraz for pelvic stabilization PT here and follow-up with me in 2 months.  Do some easy walking and do the self-correction he was taught today each morning.    Advised patient to call or return early if symptoms worsen, or having problems controlling bladder and bowel function or worsening leg weakness.     Please note: Voice recognition software was used in this dictation.  It may therefore contain typographical errors.    Rodriguez García MD             Again, thank you for allowing me to participate in  the care of your patient.        Sincerely,        Rodriguez García MD

## 2023-08-23 NOTE — NURSING NOTE
Reason For Visit:   Chief Complaint   Patient presents with    New Patient     Disectomy 2007 L5 -S1 and DDD, sports med said not a candidate for them, Sciatica going o         Occupation:   Currently working? Yes.  Work status?  Full time.    Sports: no  Activities: hunt, fish             /88 (BP Location: Right arm, Patient Position: Sitting, Cuff Size: Adult Large)   Pulse 77   Wt 121 kg (266 lb 11.2 oz)   BMI 37.20 kg/m        Allergies   Allergen Reactions    Nkda [No Known Drug Allergy]        Current Outpatient Medications   Medication    NEW MED    cyclobenzaprine (FLEXERIL) 10 MG tablet    gabapentin (NEURONTIN) 100 MG capsule    HYDROcodone-acetaminophen (NORCO) 5-325 MG tablet    Ibuprofen (IBU PO)    methylPREDNISolone (MEDROL DOSEPAK) 4 MG tablet therapy pack     No current facility-administered medications for this visit.         Gissel Chavez LPN

## 2023-09-20 NOTE — PROGRESS NOTES
Subjective:    Mason Durán is a 57 year old male who presents today for follow-up regarding   Subacute low back pain for 7 to 8 months  Pelvic Joint Dysfunction manifesting as a right posterior innominate rotation  Subtle right foot drop possibly from unleveling of the pelvis with subsequent right L5 impingement narrowed foramen  PT with Ayan.  Hold off on injections and monitor foot drop.  Return early if it is worse.    PRIOR HISTORY from 8/23/2023:  He was seen for evaluation of low back pain upon referral from Dr. Jaquelin Chavez, whose 8/3/2023 office note documents chronic right-sided low back and hip pain, ongoing despite several months of PT.  Sports medicine declined to see him.  Medrol Dosepak and gabapentin 100 mg 3 times daily trial and continue Flexeril ordered.  Continued PT and lumbar MRI ordered.        History today:  He is here with his wife Radha assisting with the history.  Juan Alberto stopped all of his medications about 3 to 4 days ago with only a minor uptick in his symptoms.  Prior to that time he had significant constipation which has resolved.     He started having low back pain in the right SI area and down into the thigh and calf without any inciting event about 7 to 8 months ago.  PT has not helped.  He does not have any change in his old chronic right leg and toe numbness ever since his lumbar surgery which he remembers being on the right side in 2007.  He does not trust his right leg but does not describe any specific weakness.  When he puts weight on the right leg it hurts in the right SI area and as result he walks with a slight limp when bearing weight on the right leg.  He denies any bowel or bladder dysfunction, saddle anesthesia, or sexual dysfunction.  No other numbness in the right lower extremity.  He described some transient allodynia in the right leg and in the anterior pelvic area but that has resolved.  He has been working with Ayan in the [employer] center doing PT  "and initially it helped but some of the HEP exercises make it worse.  In the last month he has been \"snagging\" his right toe at work suggesting a subtle foot drop.    INTERIM HISTORY:    Juan Alberto has not had a chance to start his pelvic stabilization PT but it is slated to begin on October 11.  The quality of his pain is less sharp but he still has a dull ache although work is a little bit easier to tolerate.  His chronic  right sole and toe numbness has been present ever since his 2007 surgery and his sense of \"snagging\" his toe with walking has been present for years.  Those have not changed.  Once in a while he will get a shooting pain down his leg that lasts briefly but is not persistent.  He has not noticed any new weakness.    Past medical and surgical history:   Pertinent for obesity,  and history of Prior single level lumbar decompression 8/24/2007 ( right L5-S1?)     SOCIAL HX: He is  and they have 2 children but no grandchildren.  He works as a  lifting up to 40 pounds but not a lot of bending stooping and twisting.  Sports hobbies and activities: Fishing and hunting    Objective:    IMAGING:  Images and reports reviewed.     MRI LUMBAR SPINE WITHOUT AND WITH CONTRAST  8/10/2023     COMPARISON: Lumbar spine x-ray 12/27/2013.      L3-L4: No loss of disc height. Small right foraminal disc protrusion  with right uncinate spurring. Normal facets. No spinal canal  narrowing. No significant right neural foraminal narrowing. No left  neural foraminal narrowing.     L4-L5: Mild loss of disc height and signal. Left foraminal disc bulge  with uncinate spurring. Mild facet hypertrophy. No spinal canal  narrowing. No right neural foraminal narrowing. Mild left neural  foraminal narrowing.      L5-S1: Moderate loss of disc height and signal with degenerative  endplate changes. Posterior disc bulge with endplate osteophytic  spurring. Bilateral facet hypertrophy. No spinal canal narrowing. Mild  right " neural foraminal narrowing. Mild left neural foraminal  narrowing.                                                                   IMPRESSION:    1. Degenerative changes in the lower lumbar spine as detailed above,  most pronounced at L5-S1.  2. Mild neural foraminal narrowings bilaterally at L5-S1 and on the  left at L4-L5.  3. No spinal canal narrowing at any level.   4. Endplate edema asymmetric towards right at L5-S1. This finding has  been associated with a source of pain in some patients.  (OM-no comment regarding past surgery which appears to be right L5-S1 laminectomy)    EXAMINATION:    CONSTITUTIONAL:  Vital signs as above.  No acute distress.  The patient is well nourished and well groomed.    PSYCHIATRIC:  The patient is awake, alert, oriented to person, place and time.  The patient is answering questions appropriately with clear speech.  Normal affect.  Able to follow commands  MUSCULOSKELETAL:  Gait is non-antalgic.  The patient is able to heel and toe walk without any difficulty.   Squat and rise normal.   .  Back ROM: Limited in flexion with hands to knees accompanied by some right posterior leg tightness but not left, but no radiating pain.  Full and painless in extension     NEUROLOGICAL:    Motor:  L3-S1 myotomes 5/5     Sensation to light touch is diminished in the sole and lateral aspect of his right foot only, but otherwise intact in the bilateral lower extremities.    SLR negative    Pelvis: Negative standing flexion, Siobhan sign, and stork test.  Midline lumbar scar over the L5-S1 disc space    Assessment     Mason Durán  is a 57 year old y.o. male who presents today for follow-up regarding   Subacute low back pain for 8-10 months-improved  Pelvic Joint Dysfunction-resolved with clinical improvement as expected  Chronic right S1 radicular numbness likely from an old right L5-S1 disc herniation decompressed in 2007      Plan:  Juan Alberto and I discussed what I anticipate will be some mild  persistent discomfort, but his final plateau we will know for another year as the ligaments continue to heal.  He has talked about having a subtle chronic right foot drop suspected because he tends to trip with that toe from time to time.  This is not detectable clinically and probably has to do with what I suspect was an old right L5-S1 disc herniation.  He will augment his home program when he starts pelvic stabilization PT with Ayan.  We talked about follow-up and at this point we agreed to leave that as needed.  Overall I think we have nudged him in the right direction.    Advised patient to call or return early if symptoms worsen, or having problems controlling bladder and bowel function or worsening leg weakness.     Please note: Voice recognition software was used in this dictation.  It may therefore contain typographical errors.  Rodriguez García MD

## 2023-10-03 ENCOUNTER — OFFICE VISIT (OUTPATIENT)
Dept: NEUROSURGERY | Facility: CLINIC | Age: 58
End: 2023-10-03
Payer: COMMERCIAL

## 2023-10-03 VITALS
WEIGHT: 265.5 LBS | DIASTOLIC BLOOD PRESSURE: 83 MMHG | HEIGHT: 71 IN | BODY MASS INDEX: 37.17 KG/M2 | SYSTOLIC BLOOD PRESSURE: 143 MMHG | HEART RATE: 86 BPM

## 2023-10-03 DIAGNOSIS — M99.04 SACROILIAC JOINT SOMATIC DYSFUNCTION: Primary | ICD-10-CM

## 2023-10-03 PROCEDURE — 99213 OFFICE O/P EST LOW 20 MIN: CPT | Performed by: PREVENTIVE MEDICINE

## 2023-10-03 ASSESSMENT — PAIN SCALES - GENERAL: PAINLEVEL: MODERATE PAIN (5)

## 2023-10-03 NOTE — LETTER
10/3/2023         RE: Mason Durán  [id number] [address] [place] [address]        Dear Colleague,    Thank you for referring your patient, Mason Durán, to the [facility] [place]. Please see a copy of my visit note below.      Subjective:    Mason Durán is a 57 year old male who presents today for follow-up regarding   Subacute low back pain for 7 to 8 months  Pelvic Joint Dysfunction manifesting as a right posterior innominate rotation  Subtle right foot drop possibly from unleveling of the pelvis with subsequent right L5 impingement narrowed foramen  PT with [facility].  Hold off on injections and monitor foot drop.  Return early if it is worse.    PRIOR HISTORY from 8/23/2023:  He was seen for evaluation of low back pain upon referral from Dr. Jaquelin Chavez, whose 8/3/2023 office note documents chronic right-sided low back and hip pain, ongoing despite several months of PT.  Sports medicine declined to see him.  Medrol Dosepak and gabapentin 100 mg 3 times daily trial and continue Flexeril ordered.  Continued PT and lumbar MRI ordered.        History today:  He is here with his wife Radha assisting with the history.  Juan Alberto stopped all of his medications about 3 to 4 days ago with only a minor uptick in his symptoms.  Prior to that time he had significant constipation which has resolved.     He started having low back pain in the right SI area and down into the thigh and calf without any inciting event about 7 to 8 months ago.  PT has not helped.  He does not have any change in his old chronic right leg and toe numbness ever since his lumbar surgery which he remembers being on the right side in 2007.  He does not trust his right leg but does not describe any specific weakness.  When he puts weight on the right leg it hurts in the right SI area and as result he walks with a slight limp when bearing weight on the right leg.  He denies any bowel or bladder dysfunction,  "saddle anesthesia, or sexual dysfunction.  No other numbness in the right lower extremity.  He described some transient allodynia in the right leg and in the anterior pelvic area but that has resolved.  He has been working with Ayan in the [facility] doing PT and initially it helped but some of the HEP exercises make it worse.  In the last month he has been \"snagging\" his right toe at work suggesting a subtle foot drop.    INTERIM HISTORY:    Juan Alberto has not had a chance to start his pelvic stabilization PT but it is slated to begin on October 11.  The quality of his pain is less sharp but he still has a dull ache although work is a little bit easier to tolerate.  His chronic  right sole and toe numbness has been present ever since his 2007 surgery and his sense of \"snagging\" his toe with walking has been present for years.  Those have not changed.  Once in a while he will get a shooting pain down his leg that lasts briefly but is not persistent.  He has not noticed any new weakness.    Past medical and surgical history:   Pertinent for obesity,  and history of Prior single level lumbar decompression 8/24/2007 ( right L5-S1?)     SOCIAL HX: He is  and they have 2 children but no grandchildren.  He works as a  lifting up to 40 pounds but not a lot of bending stooping and twisting.  Sports hobbies and activities: Fishing and hunting    Objective:    IMAGING:  Images and reports reviewed.     MRI LUMBAR SPINE WITHOUT AND WITH CONTRAST  8/10/2023     COMPARISON: Lumbar spine x-ray 12/27/2013.      L3-L4: No loss of disc height. Small right foraminal disc protrusion  with right uncinate spurring. Normal facets. No spinal canal  narrowing. No significant right neural foraminal narrowing. No left  neural foraminal narrowing.     L4-L5: Mild loss of disc height and signal. Left foraminal disc bulge  with uncinate spurring. Mild facet hypertrophy. No spinal canal  narrowing. No right neural foraminal " narrowing. Mild left neural  foraminal narrowing.      L5-S1: Moderate loss of disc height and signal with degenerative  endplate changes. Posterior disc bulge with endplate osteophytic  spurring. Bilateral facet hypertrophy. No spinal canal narrowing. Mild  right neural foraminal narrowing. Mild left neural foraminal  narrowing.                                                                   IMPRESSION:    1. Degenerative changes in the lower lumbar spine as detailed above,  most pronounced at L5-S1.  2. Mild neural foraminal narrowings bilaterally at L5-S1 and on the  left at L4-L5.  3. No spinal canal narrowing at any level.   4. Endplate edema asymmetric towards right at L5-S1. This finding has  been associated with a source of pain in some patients.  (OM-no comment regarding past surgery which appears to be right L5-S1 laminectomy)    EXAMINATION:    CONSTITUTIONAL:  Vital signs as above.  No acute distress.  The patient is well nourished and well groomed.    PSYCHIATRIC:  The patient is awake, alert, oriented to person, place and time.  The patient is answering questions appropriately with clear speech.  Normal affect.  Able to follow commands  MUSCULOSKELETAL:  Gait is non-antalgic.  The patient is able to heel and toe walk without any difficulty.   Squat and rise normal.   .  Back ROM: Limited in flexion with hands to knees accompanied by some right posterior leg tightness but not left, but no radiating pain.  Full and painless in extension     NEUROLOGICAL:    Motor:  L3-S1 myotomes 5/5     Sensation to light touch is diminished in the sole and lateral aspect of his right foot only, but otherwise intact in the bilateral lower extremities.    SLR negative    Pelvis: Negative standing flexion, Siobhan sign, and stork test.  Midline lumbar scar over the L5-S1 disc space    Assessment     Mason Durán  is a 57 year old y.o. male who presents today for follow-up regarding   Subacute low back pain for 8-10  months-improved  Pelvic Joint Dysfunction-resolved with clinical improvement as expected  Chronic right S1 radicular numbness likely from an old right L5-S1 disc herniation decompressed in 2007      Plan:  Juan Alberto and I discussed what I anticipate will be some mild persistent discomfort, but his final plateau we will know for another year as the ligaments continue to heal.  He has talked about having a subtle chronic right foot drop suspected because he tends to trip with that toe from time to time.  This is not detectable clinically and probably has to do with what I suspect was an old right L5-S1 disc herniation.  He will augment his home program when he starts pelvic stabilization PT with [facility].  We talked about follow-up and at this point we agreed to leave that as needed.  Overall I think we have nudged him in the right direction.    Advised patient to call or return early if symptoms worsen, or having problems controlling bladder and bowel function or worsening leg weakness.     Please note: Voice recognition software was used in this dictation.  It may therefore contain typographical errors.  Rodriguez García MD      Again, thank you for allowing me to participate in the care of your patient.        Sincerely,        Rodriguez García MD

## 2023-10-03 NOTE — NURSING NOTE
"Reason For Visit:   Chief Complaint   Patient presents with    RECHECK     Pelvic Joint dysfuntion       Occupation:   Currently working? Yes.  Work status?  Full time.     Sports: no  Activities: hunt, fish           BP (!) 143/83   Pulse 86   Ht 1.803 m (5' 11\")   Wt 120.4 kg (265 lb 8 oz)   BMI 37.03 kg/m        Allergies   Allergen Reactions    Nkda [No Known Drug Allergy]        Current Outpatient Medications   Medication    NEW MED     No current facility-administered medications for this visit.         Darla Severin-Brown, LPN   "

## 2023-10-03 NOTE — PATIENT INSTRUCTIONS
Juan Alberto I am pleased with where things are moving.  See the assessment and plan below for further details of our discussion today.  I am happy to see you at any time in the future but we can leave that as needed.  I wish you all the best    Assessment     Mason Durán  is a 57 year old y.o. male who presents today for follow-up regarding   Subacute low back pain for 8-10 months-improved  Pelvic Joint Dysfunction-resolved with clinical improvement as expected  Chronic right S1 radicular numbness likely from an old right L5-S1 disc herniation decompressed in 2007      Plan:  Juan Alberto and I discussed what I anticipate will be some mild persistent discomfort, but his final plateau we will know for another year as the ligaments continue to heal.  He has talked about having a subtle chronic right foot drop suspected because he tends to trip with that toe from time to time.  This is not detectable clinically and probably has to do with what I suspect was an old right L5-S1 disc herniation.  He will augment his home program when he starts pelvic stabilization PT with [facility].  We talked about follow-up and at this point we agreed to leave that as needed.  Overall I think we have nudged him in the right direction.

## 2023-10-11 ENCOUNTER — THERAPY VISIT (OUTPATIENT)
Dept: PHYSICAL THERAPY | Facility: CLINIC | Age: 58
End: 2023-10-11
Attending: PREVENTIVE MEDICINE
Payer: COMMERCIAL

## 2023-10-11 DIAGNOSIS — M79.18 MYOFASCIAL PAIN: ICD-10-CM

## 2023-10-11 DIAGNOSIS — M99.04 SACROILIAC JOINT SOMATIC DYSFUNCTION: ICD-10-CM

## 2023-10-11 PROCEDURE — 97162 PT EVAL MOD COMPLEX 30 MIN: CPT | Mod: GP

## 2023-10-11 PROCEDURE — 97110 THERAPEUTIC EXERCISES: CPT | Mod: GP

## 2023-10-11 ASSESSMENT — ACTIVITIES OF DAILY LIVING (ADL)
DEEP_SQUATTING: SLIGHT DIFFICULTY
GOING_DOWN_1_FLIGHT_OF_STAIRS: EXTREME DIFFICULTY
LIGHT_TO_MODERATE_WORK: MODERATE DIFFICULTY
RECREATIONAL_ACTIVITIES: MODERATE DIFFICULTY
WALKING_15_MINUTES_OR_GREATER: MODERATE DIFFICULTY
STANDING_FOR_15_MINUTES: EXTREME DIFFICULTY
HOS_ADL_SCORE(%): 41.18
PUTTING_ON_SOCKS_AND_SHOES: SLIGHT DIFFICULTY
HOS_ADL_HIGHEST_POTENTIAL_SCORE: 68
WALKING_APPROXIMATELY_10_MINUTES: EXTREME DIFFICULTY
SITTING_FOR_15_MINUTES: EXTREME DIFFICULTY
ROLLING_OVER_IN_BED: MODERATE DIFFICULTY
WALKING_INITIALLY: MODERATE DIFFICULTY
HOS_ADL_COUNT: 17
GETTING_INTO_AND_OUT_OF_AN_AVERAGE_CAR: MODERATE DIFFICULTY
STEPPING_UP_AND_DOWN_CURBS: EXTREME DIFFICULTY
GOING_UP_1_FLIGHT_OF_STAIRS: EXTREME DIFFICULTY
HOW_WOULD_YOU_RATE_YOUR_CURRENT_LEVEL_OF_FUNCTION_DURING_YOUR_USUAL_ACTIVITIES_OF_DAILY_LIVING_FROM_0_TO_100_WITH_100_BEING_YOUR_LEVEL_OF_FUNCTION_PRIOR_TO_YOUR_HIP_PROBLEM_AND_0_BEING_THE_INABILITY_TO_PERFORM_ANY_OF_YOUR_USUAL_DAILY_ACTIVITIES?: 50
GETTING_INTO_AND_OUT_OF_A_BATHTUB: EXTREME DIFFICULTY
HOS_ADL_ITEM_SCORE_TOTAL: 28
TWISTING/PIVOTING_ON_INVOLVED_LEG: MODERATE DIFFICULTY
WALKING_UP_STEEP_HILLS: EXTREME DIFFICULTY
HEAVY_WORK: SLIGHT DIFFICULTY
WALKING_DOWN_STEEP_HILLS: EXTREME DIFFICULTY

## 2023-10-11 NOTE — PROGRESS NOTES
PHYSICAL THERAPY EVALUATION  Type of Visit: Evaluation    See electronic medical record for Abuse and Falls Screening details.    Subjective       Presenting condition or subjective complaint: Back and Leg Pain  Date of onset: 08/23/23    Relevant medical history: Pain at night or rest   Dates & types of surgery: Discectomy    Prior diagnostic imaging/testing results: MRI     Prior therapy history for the same diagnosis, illness or injury: Yes      Prior Level of Function  Transfers:   Ambulation:   ADL:   IADL:     Living Environment  Social support: With a significant other or spouse   Type of home: Multi-level   Stairs to enter the home: Yes       Ramp:     Stairs inside the home:     Is there a railing: Yes   Help at home:    Equipment owned: Straight Cane; Four-point cane     Employment:     Hobbies/Interests: Fishing and Hunting    Subjective Summary: He reports that he has some pain that he can't shake.  He reports he did some stretching and some of the stuff that Dr. García discussed helped make it a little better but not quite.  He reports he struggles to sleep due to pain.  He can't sleep on either side or on his back without getting pain in that low back. He reports that he was seeing [facility] but after meeting with Dr. García he questions if he was chasing the wrong animal. After doing the OMT, he felt symptoms hurt at first but after awhile he felt his symptoms getting better.    Patient goals for therapy:      Pain assessment:      Objective   LUMBAR SPINE EVALUATION  PAIN: Pain Level at Rest: 3/10  Pain Level with Use: 7/10  Pain Location: lumbar spine and hip  INTEGUMENTARY (edema, incisions):   POSTURE:   GAIT:   Weightbearing Status:   Assistive Device(s):   Gait Deviations:   BALANCE/PROPRIOCEPTION:   WEIGHTBEARING ALIGNMENT:   NON-WEIGHTBEARING ALIGNMENT:    ROM:   PELVIC/SI SCREEN:   STRENGTH:  Right Hip Flexion: 3/5, Right Knee Extension: 4/5, Right Knee Flexion: 4/5, Right Ankle  Dorsiflexion: 3/5.  Left Leg MMT: All 5/5    MYOTOMES:  Foot Drop present with 3/5 Right Hip Flexion and Right Ankle Dorsiflexion  DTR S:   CORD SIGNS:   DERMATOMES:   NEURAL TENSION:  Extreme and very positive on the right  FLEXIBILITY:   LUMBAR/HIP Special Tests:    PELVIS/SI SPECIAL TESTS:   FUNCTIONAL TESTS:   PALPATION:   SPINAL SEGMENTAL CONCLUSIONS:       Assessment & Plan   CLINICAL IMPRESSIONS  Medical Diagnosis: Sacroilliac Joint Somatic Dysfunction    Treatment Diagnosis: Right Sided Low Back Pain with right radicular pain and pelvic joint dysfunction   Impression/Assessment: Patient is a 57 year old male with Right Sided low back pain / SI Joint pain with right radicular symptoms complaints.  The following significant findings have been identified: Pain, Decreased ROM/flexibility, Decreased joint mobility, Decreased strength, Impaired gait, Impaired muscle performance, and Decreased activity tolerance. These impairments interfere with their ability to perform self care tasks, work tasks, recreational activities, household chores, driving , household mobility, and community mobility as compared to previous level of function.     Clinical Decision Making (Complexity):  Clinical Presentation: Evolving/Changing  Clinical Presentation Rationale: based on medical and personal factors listed in PT evaluation  Clinical Decision Making (Complexity): Moderate complexity    PLAN OF CARE  Treatment Interventions:  Interventions: Gait Training, Manual Therapy, Neuromuscular Re-education, Therapeutic Activity, Therapeutic Exercise    Long Term Goals     PT Goal 1  Goal Identifier: Working  Goal Description: Patient will be able to work a full day of work without increased low back worse than 3/10 and no pain in the right lower extremity  Rationale: to maximize safety and independence with performance of ADLs and functional tasks;to maximize safety and independence within the home;to maximize safety and independence  within the community;to maximize safety and independence with transportation;to maximize safety and independence with self cares  Goal Progress: Currently gets significant right sided low back pain and right thigh pain as well as right foot numbness  Target Date: 01/03/24  PT Goal 2  Goal Identifier: Sleeping  Goal Description: Patient will be able to sleep a full night without pain or disruption of sleep  Rationale: to maximize safety and independence with performance of ADLs and functional tasks;to maximize safety and independence within the home;to maximize safety and independence within the community;to maximize safety and independence with transportation;to maximize safety and independence with self cares  Goal Progress: Currently struggles to sleep and does not get a full nights sleep without waking up due to extreme pain  Target Date: 01/03/24      Frequency of Treatment: 1x a week  Duration of Treatment: 12 weeks    Recommended Referrals to Other Professionals: Physical Therapy  Education Assessment:        Risks and benefits of evaluation/treatment Willhave been explained.   Patient/Family/caregiver agrees with Plan of Care.     Evaluation Time:     PT Eval, Moderate Complexity Minutes (68352): 41       Signing Clinician: Ayan Almaraz PT

## 2023-10-16 ENCOUNTER — THERAPY VISIT (OUTPATIENT)
Dept: PHYSICAL THERAPY | Facility: CLINIC | Age: 58
End: 2023-10-16
Payer: COMMERCIAL

## 2023-10-16 DIAGNOSIS — M54.41 CHRONIC RIGHT-SIDED LOW BACK PAIN WITH RIGHT-SIDED SCIATICA: Primary | ICD-10-CM

## 2023-10-16 DIAGNOSIS — M54.17 LUMBOSACRAL RADICULOPATHY: ICD-10-CM

## 2023-10-16 DIAGNOSIS — G89.29 CHRONIC RIGHT-SIDED LOW BACK PAIN WITH RIGHT-SIDED SCIATICA: Primary | ICD-10-CM

## 2023-10-16 PROCEDURE — 97110 THERAPEUTIC EXERCISES: CPT | Mod: GP

## 2023-10-27 ENCOUNTER — THERAPY VISIT (OUTPATIENT)
Dept: PHYSICAL THERAPY | Facility: CLINIC | Age: 58
End: 2023-10-27
Payer: COMMERCIAL

## 2023-10-27 DIAGNOSIS — G89.29 CHRONIC RIGHT-SIDED LOW BACK PAIN WITH RIGHT-SIDED SCIATICA: ICD-10-CM

## 2023-10-27 DIAGNOSIS — M54.17 LUMBOSACRAL RADICULOPATHY: Primary | ICD-10-CM

## 2023-10-27 DIAGNOSIS — M54.41 CHRONIC RIGHT-SIDED LOW BACK PAIN WITH RIGHT-SIDED SCIATICA: ICD-10-CM

## 2023-10-27 PROCEDURE — 97110 THERAPEUTIC EXERCISES: CPT | Mod: GP

## 2023-11-02 ENCOUNTER — THERAPY VISIT (OUTPATIENT)
Dept: PHYSICAL THERAPY | Facility: CLINIC | Age: 58
End: 2023-11-02
Payer: COMMERCIAL

## 2023-11-02 DIAGNOSIS — M54.17 LUMBOSACRAL RADICULOPATHY: ICD-10-CM

## 2023-11-02 DIAGNOSIS — M54.41 CHRONIC RIGHT-SIDED LOW BACK PAIN WITH RIGHT-SIDED SCIATICA: Primary | ICD-10-CM

## 2023-11-02 DIAGNOSIS — G89.29 CHRONIC RIGHT-SIDED LOW BACK PAIN WITH RIGHT-SIDED SCIATICA: Primary | ICD-10-CM

## 2023-11-02 PROCEDURE — 97110 THERAPEUTIC EXERCISES: CPT | Mod: GP

## 2023-11-02 PROCEDURE — 97140 MANUAL THERAPY 1/> REGIONS: CPT | Mod: GP

## 2023-11-09 ENCOUNTER — THERAPY VISIT (OUTPATIENT)
Dept: PHYSICAL THERAPY | Facility: CLINIC | Age: 58
End: 2023-11-09
Payer: COMMERCIAL

## 2023-11-09 DIAGNOSIS — M54.17 LUMBOSACRAL RADICULOPATHY: ICD-10-CM

## 2023-11-09 DIAGNOSIS — M54.41 CHRONIC RIGHT-SIDED LOW BACK PAIN WITH RIGHT-SIDED SCIATICA: Primary | ICD-10-CM

## 2023-11-09 DIAGNOSIS — G89.29 CHRONIC RIGHT-SIDED LOW BACK PAIN WITH RIGHT-SIDED SCIATICA: Primary | ICD-10-CM

## 2023-11-09 PROCEDURE — 97110 THERAPEUTIC EXERCISES: CPT | Mod: GP

## 2024-01-11 NOTE — PROGRESS NOTES
11/09/23 0500   Appointment Info   Signing clinician's name / credentials Ayan Mendoza, PT, DPT, CSCS, CLT   Total/Authorized Visits E&T   Visits Used 5   Medical Diagnosis Sacroilliac Joint Somatic Dysfunction   PT Tx Diagnosis Right Sided Low Back Pain with right radicular pain and pelvic joint dysfunction   Progress Note/Certification   Onset of illness/injury or Date of Surgery 08/23/23   Therapy Frequency 1x a week   Predicted Duration 12 weeks   Progress Note Due Date 01/03/24   GOALS   PT Goals 2   PT Goal 1   Goal Identifier Working   Goal Description Patient will be able to work a full day of work without increased low back worse than 3/10 and no pain in the right lower extremity   Rationale to maximize safety and independence with performance of ADLs and functional tasks;to maximize safety and independence within the home;to maximize safety and independence within the community;to maximize safety and independence with transportation;to maximize safety and independence with self cares   Goal Progress Abolished symptoms at PT. Will perform at home and work to improve tolerance.   Target Date 01/03/24   PT Goal 2   Goal Identifier Sleeping   Goal Description Patient will be able to sleep a full night without pain or disruption of sleep   Rationale to maximize safety and independence with performance of ADLs and functional tasks;to maximize safety and independence within the home;to maximize safety and independence within the community;to maximize safety and independence with transportation;to maximize safety and independence with self cares   Goal Progress Still has difficulty finding a good positioning   Target Date 01/03/24   Subjective Report   Subjective Report He reports his leg symptoms are feeling good and symptom free. However he reports recently the low back is stiff with no shooting pains or weird pains in his feet.  He reports he also had work where he was slightly leaned forward which created  stiffness in the low back slightly higher in the low back.   Objective Measures   Objective Measures Objective Measure 1;Objective Measure 2   Objective Measure 1   Objective Measure Directional Preference   Details Prone press up with legs elevated (Improved and centralized. Better)   Objective Measure 2   Objective Measure Return To Function   Details No pain or increased symptoms with 10 reps of standing loaded lumbar flexion, supine double knees to chest, supine lumbar flexion rotation, and supine bridges all did not increase pain or symptoms. Well tolerated.   Treatment Interventions (PT)   Interventions Therapeutic Procedure/Exercise   Therapeutic Procedure/Exercise   Therapeutic Procedures: strength, endurance, ROM, flexibillity minutes (14177) 40   Therapeutic Procedures Ther Proc 2;Ther Proc 3;Ther Proc 4   Ther Proc 1 Return to Function (following two sets of press ups)   Ther Proc 1 - Details Repeated Standing Lumbar Flexion: Hands to Ankles x10 reps, Supine Double Knees to Chest x10 reps, Supine Lumbar Rotation x5 each direction,  Bridge x20   Ther Proc 4 Repex   Ther Proc 4 - Details 30 reps starting at 8 degrees and progress to 16 degrees with 5 second hold   PTRx Ther Proc 1 Gluteal Myofascial Full Arc   PTRx Ther Proc 1 - Details Reviewed but completely switched to directional preference today   PTRx Ther Proc 2 Gluteal Myofascial Upper Arc   PTRx Ther Proc 2 - Details Reviewed but completely switched to directional preference today   PTRx Ther Proc 3 Gluteal Myofascial Lower Arc   PTRx Ther Proc 3 - Details Reviewed but completely switched to directional preference today   PTRx Ther Proc 4 Gluteal Myofascial Flexion Extension Full Arc Combo   PTRx Ther Proc 4 - Details Reviewed but completely switched to directional preference today   PTRx Ther Proc 5 Gluteal Myofascial Piriformis Cruncher   PTRx Ther Proc 5 - Details Reviewed but completely switched to directional preference today   PTRx Ther Proc  6 Pretzel Stretch   PTRx Ther Proc 6 - Details Reviewed but completely switched to directional preference today   PTRx Ther Proc 7 Neutral Side-lying   PTRx Ther Proc 7 - Details No Notes   PTRx Ther Proc 8 90/90 Position   PTRx Ther Proc 8 - Details No Notes   PTRx Ther Proc 9 Prone Press Ups   PTRx Ther Proc 9 - Details 2x10 (Better during and after. Centralized ). Followed by 2x10 with Legs elevated for increased pelvic tilt and lumbar extension (Better and centralized with increased pressure in the Lower Lumbar Spine)   PTRx Ther Proc 10 All 4s Cat Cow   PTRx Ther Proc 10 - Details Reviewed   Skilled Intervention Directional preference   Patient Response/Progress Tolerated return to function well   Plan   Home program See PTRx   Updates to plan of care Switch from GM to entirely directional preference   Plan for next session Discharged   Total Session Time   Timed Code Treatment Minutes 40   Total Treatment Time (sum of timed and untimed services) 40           DISCHARGE  Reason for Discharge: Patient has met all goals.    Equipment Issued: None    Discharge Plan: Patient to continue home program.    Referring Provider:  Rodriguez Gacría

## 2024-07-09 ENCOUNTER — ANCILLARY PROCEDURE (OUTPATIENT)
Dept: GENERAL RADIOLOGY | Facility: CLINIC | Age: 59
End: 2024-07-09
Attending: PHYSICIAN ASSISTANT
Payer: COMMERCIAL

## 2024-07-09 ENCOUNTER — OFFICE VISIT (OUTPATIENT)
Dept: URGENT CARE | Facility: URGENT CARE | Age: 59
End: 2024-07-09
Payer: COMMERCIAL

## 2024-07-09 ENCOUNTER — NURSE TRIAGE (OUTPATIENT)
Dept: FAMILY MEDICINE | Facility: CLINIC | Age: 59
End: 2024-07-09
Payer: COMMERCIAL

## 2024-07-09 VITALS
TEMPERATURE: 98 F | HEART RATE: 69 BPM | WEIGHT: 253 LBS | OXYGEN SATURATION: 95 % | SYSTOLIC BLOOD PRESSURE: 162 MMHG | RESPIRATION RATE: 18 BRPM | DIASTOLIC BLOOD PRESSURE: 78 MMHG | BODY MASS INDEX: 35.29 KG/M2

## 2024-07-09 DIAGNOSIS — R05.1 ACUTE COUGH: Primary | ICD-10-CM

## 2024-07-09 DIAGNOSIS — J98.01 ACUTE BRONCHOSPASM: ICD-10-CM

## 2024-07-09 LAB
BASOPHILS # BLD AUTO: 0 10E3/UL (ref 0–0.2)
BASOPHILS NFR BLD AUTO: 0 %
EOSINOPHIL # BLD AUTO: 0.3 10E3/UL (ref 0–0.7)
EOSINOPHIL NFR BLD AUTO: 3 %
ERYTHROCYTE [DISTWIDTH] IN BLOOD BY AUTOMATED COUNT: 12.9 % (ref 10–15)
HCT VFR BLD AUTO: 46.7 % (ref 40–53)
HGB BLD-MCNC: 15.4 G/DL (ref 13.3–17.7)
IMM GRANULOCYTES # BLD: 0 10E3/UL
IMM GRANULOCYTES NFR BLD: 0 %
LYMPHOCYTES # BLD AUTO: 2.3 10E3/UL (ref 0.8–5.3)
LYMPHOCYTES NFR BLD AUTO: 26 %
MCH RBC QN AUTO: 30.7 PG (ref 26.5–33)
MCHC RBC AUTO-ENTMCNC: 33 G/DL (ref 31.5–36.5)
MCV RBC AUTO: 93 FL (ref 78–100)
MONOCYTES # BLD AUTO: 0.7 10E3/UL (ref 0–1.3)
MONOCYTES NFR BLD AUTO: 8 %
NEUTROPHILS # BLD AUTO: 5.6 10E3/UL (ref 1.6–8.3)
NEUTROPHILS NFR BLD AUTO: 63 %
PLATELET # BLD AUTO: 221 10E3/UL (ref 150–450)
RBC # BLD AUTO: 5.02 10E6/UL (ref 4.4–5.9)
WBC # BLD AUTO: 8.8 10E3/UL (ref 4–11)

## 2024-07-09 PROCEDURE — 71046 X-RAY EXAM CHEST 2 VIEWS: CPT | Mod: TC | Performed by: RADIOLOGY

## 2024-07-09 PROCEDURE — 85025 COMPLETE CBC W/AUTO DIFF WBC: CPT | Performed by: PHYSICIAN ASSISTANT

## 2024-07-09 PROCEDURE — 99204 OFFICE O/P NEW MOD 45 MIN: CPT | Performed by: PHYSICIAN ASSISTANT

## 2024-07-09 PROCEDURE — 36415 COLL VENOUS BLD VENIPUNCTURE: CPT | Performed by: PHYSICIAN ASSISTANT

## 2024-07-09 RX ORDER — PREDNISONE 20 MG/1
40 TABLET ORAL DAILY
Qty: 10 TABLET | Refills: 0 | Status: SHIPPED | OUTPATIENT
Start: 2024-07-09 | End: 2024-07-14

## 2024-07-09 RX ORDER — ALBUTEROL SULFATE 90 UG/1
2 AEROSOL, METERED RESPIRATORY (INHALATION) EVERY 6 HOURS PRN
Qty: 18 G | Refills: 0 | Status: SHIPPED | OUTPATIENT
Start: 2024-07-09 | End: 2024-07-31

## 2024-07-09 RX ORDER — BENZONATATE 200 MG/1
200 CAPSULE ORAL 3 TIMES DAILY PRN
Qty: 30 CAPSULE | Refills: 0 | Status: SHIPPED | OUTPATIENT
Start: 2024-07-09

## 2024-07-09 ASSESSMENT — ENCOUNTER SYMPTOMS
FEVER: 0
HEADACHES: 1
SORE THROAT: 0
SHORTNESS OF BREATH: 1
COUGH: 1
DIARRHEA: 0
RHINORRHEA: 0
NAUSEA: 0
VOMITING: 0

## 2024-07-09 NOTE — TELEPHONE ENCOUNTER
"Patient calling as was around [facility] all weekend  Coughing since, is also having wheezing.  Is a smoker but has not smoked since Sunday.   Now having muscle soreness due to coughing.     Due to wheezing, advised to be seen today.  No appointments available but patient will go to [facility].  Reason for Disposition   Wheezing is present    Answer Assessment - Initial Assessment Questions  1. ONSET: \"When did the cough begin?\"       Friday  2. SEVERITY: \"How bad is the cough today?\"       Bad- not smoking since Sunday due to cough   3. SPUTUM: \"Describe the color of your sputum\" (none, dry cough; clear, white, yellow, green)      dry  4. HEMOPTYSIS: \"Are you coughing up any blood?\" If so ask: \"How much?\" (flecks, streaks, tablespoons, etc.)      no  5. DIFFICULTY BREATHING: \"Are you having difficulty breathing?\" If Yes, ask: \"How bad is it?\" (e.g., mild, moderate, severe)     - MILD: No SOB at rest, mild SOB with walking, speaks normally in sentences, can lie down, no retractions, pulse < 100.     - MODERATE: SOB at rest, SOB with minimal exertion and prefers to sit, cannot lie down flat, speaks in phrases, mild retractions, audible wheezing, pulse 100-120.     - SEVERE: Very SOB at rest, speaks in single words, struggling to breathe, sitting hunched forward, retractions, pulse > 120       moderate  6. FEVER: \"Do you have a fever?\" If Yes, ask: \"What is your temperature, how was it measured, and when did it start?\"      no  7. CARDIAC HISTORY: \"Do you have any history of heart disease?\" (e.g., heart attack, congestive heart failure)       no  8. LUNG HISTORY: \"Do you have any history of lung disease?\"  (e.g., pulmonary embolus, asthma, emphysema)      no  9. PE RISK FACTORS: \"Do you have a history of blood clots?\" (or: recent major surgery, recent prolonged travel, bedridden)      no  10. OTHER SYMPTOMS: \"Do you have any other symptoms?\" (e.g., runny nose, wheezing, chest pain)        Dizziness, " "wheezing  11. PREGNANCY: \"Is there any chance you are pregnant?\" \"When was your last menstrual period?\"        na  12. TRAVEL: \"Have you traveled out of the country in the last month?\" (e.g., travel history, exposures)        no    Protocols used: Cough-A-[place]    "

## 2024-07-09 NOTE — PROGRESS NOTES
SUBJECTIVE:   Mason Durán is a 58 year old male presenting with a chief complaint of   Chief Complaint   Patient presents with    Urgent Care    Cough     Per patient states he was camping over the weekend since then started having a cough, now having muscle soreness and wheezing        He is an established patient of [facility].  Patient presents with productive cough and occasional SOB.  Smoker.  States was around a camp fire when coughing began.  No hx of lung problems.    Treatment:  one mucinex last night.      Review of Systems   Constitutional:  Negative for fever.   HENT:  Negative for congestion, ear pain, rhinorrhea and sore throat.    Respiratory:  Positive for cough and shortness of breath.    Gastrointestinal:  Negative for diarrhea, nausea and vomiting.   Neurological:  Positive for headaches.   All other systems reviewed and are negative.      Past Medical History:   Diagnosis Date    NO ACTIVE PROBLEMS      Family History   Problem Relation Age of Onset    Asthma Son      Current Outpatient Medications   Medication Sig Dispense Refill    albuterol (PROAIR HFA/PROVENTIL HFA/VENTOLIN HFA) 108 (90 Base) MCG/ACT inhaler Inhale 2 puffs into the lungs every 6 hours as needed for shortness of breath, wheezing or cough 18 g 0    benzonatate (TESSALON) 200 MG capsule Take 1 capsule (200 mg) by mouth 3 times daily as needed for cough 30 capsule 0    predniSONE (DELTASONE) 20 MG tablet Take 2 tablets (40 mg) by mouth daily for 5 days 10 tablet 0    NEW MED CBD gummies       Social History     Tobacco Use    Smoking status: Every Day     Current packs/day: 1.00     Types: Cigarettes    Smokeless tobacco: Never   Substance Use Topics    Alcohol use: Yes     Comment: occasionally       OBJECTIVE  BP (!) 162/78   Pulse 69   Temp 98  F (36.7  C) (Tympanic)   Resp 18   Wt 114.8 kg (253 lb)   SpO2 95%   BMI 35.29 kg/m      Physical Exam  Vitals and nursing note reviewed.   Constitutional:       Appearance:  Normal appearance. He is obese.   HENT:      Head: Normocephalic and atraumatic.      Right Ear: Tympanic membrane, ear canal and external ear normal.      Left Ear: Tympanic membrane, ear canal and external ear normal.      Nose: Nose normal.      Mouth/Throat:      Mouth: Mucous membranes are moist.      Pharynx: Oropharynx is clear.   Eyes:      Extraocular Movements: Extraocular movements intact.      Conjunctiva/sclera: Conjunctivae normal.   Cardiovascular:      Rate and Rhythm: Normal rate and regular rhythm.      Pulses: Normal pulses.      Heart sounds: Normal heart sounds.   Pulmonary:      Effort: Pulmonary effort is normal.      Breath sounds: Wheezing and rhonchi present.   Musculoskeletal:      Cervical back: Normal range of motion and neck supple. No rigidity. No muscular tenderness.   Lymphadenopathy:      Cervical: No cervical adenopathy.   Skin:     General: Skin is warm and dry.      Capillary Refill: Capillary refill takes less than 2 seconds.   Neurological:      General: No focal deficit present.      Mental Status: He is alert and oriented to person, place, and time.   Psychiatric:         Mood and Affect: Mood normal.         Behavior: Behavior normal.         Labs:  Results for orders placed or performed in visit on 07/09/24 (from the past 24 hour(s))   CBC with platelets and differential    Narrative    The following orders were created for panel order CBC with platelets and differential.  Procedure                               Abnormality         Status                     ---------                               -----------         ------                     CBC with platelets and d...[[id number]]                      Final result                 Please view results for these tests on the individual orders.   CBC with platelets and differential   Result Value Ref Range    WBC Count 8.8 4.0 - 11.0 10e3/uL    RBC Count 5.02 4.40 - 5.90 10e6/uL    Hemoglobin 15.4 13.3 - 17.7 g/dL     Hematocrit 46.7 40.0 - 53.0 %    MCV 93 78 - 100 fL    MCH 30.7 26.5 - 33.0 pg    MCHC 33.0 31.5 - 36.5 g/dL    RDW 12.9 10.0 - 15.0 %    Platelet Count 221 150 - 450 10e3/uL    % Neutrophils 63 %    % Lymphocytes 26 %    % Monocytes 8 %    % Eosinophils 3 %    % Basophils 0 %    % Immature Granulocytes 0 %    Absolute Neutrophils 5.6 1.6 - 8.3 10e3/uL    Absolute Lymphocytes 2.3 0.8 - 5.3 10e3/uL    Absolute Monocytes 0.7 0.0 - 1.3 10e3/uL    Absolute Eosinophils 0.3 0.0 - 0.7 10e3/uL    Absolute Basophils 0.0 0.0 - 0.2 10e3/uL    Absolute Immature Granulocytes 0.0 <=0.4 10e3/uL   XR Chest 2 Views    Narrative    XR CHEST 2 VIEWS 7/9/2024 3:02 PM    HISTORY: Acute cough    COMPARISON: None.      Impression    IMPRESSION: No acute cardiopulmonary process.    DESTINEY BROWN MD         SYSTEM ID:  [id number]       X-Ray was done, my findings are: Xrays reviewed by myself and independently interpreted.  Any significant discrepancies with official radiologic read, patient will be notified.      NAD    ASSESSMENT:      ICD-10-CM    1. Acute cough  R05.1 CBC with platelets and differential     XR Chest 2 Views     CBC with platelets and differential     albuterol (PROAIR HFA/PROVENTIL HFA/VENTOLIN HFA) 108 (90 Base) MCG/ACT inhaler     benzonatate (TESSALON) 200 MG capsule      2. Acute bronchospasm  J98.01 predniSONE (DELTASONE) 20 MG tablet     albuterol (PROAIR HFA/PROVENTIL HFA/VENTOLIN HFA) 108 (90 Base) MCG/ACT inhaler           Medical Decision Making:    Differential Diagnosis:  URI Adult/Peds:  Bronchitis-viral, Bronchospasm, and Pneumonia    Serious Comorbid Conditions:  Adult:   reviewed    PLAN:  Due to concerns of pneumonia, cxr performed.    Rx for prednisone, tessalon perles and albuterol inhaler.  Discussion about not smoking.  Discussed reasons to seek immediate medical attention.  Additionally if no improvement or worsening in one week, may follow up with PCP and/or UC.        Followup:    If not improving  or if condition worsens, follow up with your Primary Care Provider, If not improving or if conditions worsens over the next 12-24 hours, go to the Emergency Department    There are no Patient Instructions on file for this visit.

## 2024-07-31 DIAGNOSIS — R05.1 ACUTE COUGH: ICD-10-CM

## 2024-07-31 DIAGNOSIS — J98.01 ACUTE BRONCHOSPASM: ICD-10-CM

## 2024-07-31 RX ORDER — ALBUTEROL SULFATE 90 UG/1
2 AEROSOL, METERED RESPIRATORY (INHALATION) EVERY 6 HOURS PRN
Qty: 18 G | Refills: 0 | Status: SHIPPED | OUTPATIENT
Start: 2024-07-31

## 2024-09-13 ENCOUNTER — HOSPITAL ENCOUNTER (OUTPATIENT)
Dept: GENERAL RADIOLOGY | Facility: OTHER | Age: 59
Discharge: HOME | End: 2024-09-13
Attending: STUDENT IN AN ORGANIZED HEALTH CARE EDUCATION/TRAINING PROGRAM
Payer: COMMERCIAL

## 2024-09-13 ENCOUNTER — OFFICE VISIT (OUTPATIENT)
Dept: FAMILY MEDICINE | Facility: OTHER | Age: 59
End: 2024-09-13
Attending: STUDENT IN AN ORGANIZED HEALTH CARE EDUCATION/TRAINING PROGRAM
Payer: COMMERCIAL

## 2024-09-13 VITALS
TEMPERATURE: 97.5 F | HEIGHT: 72 IN | OXYGEN SATURATION: 98 % | RESPIRATION RATE: 24 BRPM | DIASTOLIC BLOOD PRESSURE: 76 MMHG | HEART RATE: 70 BPM | SYSTOLIC BLOOD PRESSURE: 130 MMHG | BODY MASS INDEX: 33.62 KG/M2 | WEIGHT: 248.25 LBS

## 2024-09-13 DIAGNOSIS — R10.13 EPIGASTRIC PAIN: ICD-10-CM

## 2024-09-13 DIAGNOSIS — R07.89 STERNUM PAIN: Primary | ICD-10-CM

## 2024-09-13 DIAGNOSIS — R07.89 STERNUM PAIN: ICD-10-CM

## 2024-09-13 LAB
ALBUMIN SERPL BCG-MCNC: 4.7 G/DL (ref 3.5–5.2)
ALP SERPL-CCNC: 87 U/L (ref 40–150)
ALT SERPL W P-5'-P-CCNC: 22 U/L (ref 0–70)
ANION GAP SERPL CALCULATED.3IONS-SCNC: 12 MMOL/L (ref 7–15)
AST SERPL W P-5'-P-CCNC: 22 U/L (ref 0–45)
BASOPHILS # BLD AUTO: 0 10E3/UL (ref 0–0.2)
BASOPHILS NFR BLD AUTO: 0 %
BILIRUB SERPL-MCNC: 1.2 MG/DL
BUN SERPL-MCNC: 13 MG/DL (ref 6–20)
CALCIUM SERPL-MCNC: 9.8 MG/DL (ref 8.8–10.4)
CHLORIDE SERPL-SCNC: 100 MMOL/L (ref 98–107)
CREAT SERPL-MCNC: 0.81 MG/DL (ref 0.67–1.17)
EGFRCR SERPLBLD CKD-EPI 2021: >90 ML/MIN/1.73M2
EOSINOPHIL # BLD AUTO: 0.2 10E3/UL (ref 0–0.7)
EOSINOPHIL NFR BLD AUTO: 2 %
ERYTHROCYTE [DISTWIDTH] IN BLOOD BY AUTOMATED COUNT: 13 % (ref 10–15)
GLUCOSE SERPL-MCNC: 126 MG/DL (ref 70–99)
HCO3 SERPL-SCNC: 25 MMOL/L (ref 22–29)
HCT VFR BLD AUTO: 47.8 % (ref 40–53)
HGB BLD-MCNC: 16.2 G/DL (ref 13.3–17.7)
IMM GRANULOCYTES # BLD: 0 10E3/UL
IMM GRANULOCYTES NFR BLD: 0 %
LIPASE SERPL-CCNC: 18 U/L (ref 13–60)
LYMPHOCYTES # BLD AUTO: 2 10E3/UL (ref 0.8–5.3)
LYMPHOCYTES NFR BLD AUTO: 20 %
MCH RBC QN AUTO: 30.7 PG (ref 26.5–33)
MCHC RBC AUTO-ENTMCNC: 33.9 G/DL (ref 31.5–36.5)
MCV RBC AUTO: 91 FL (ref 78–100)
MONOCYTES # BLD AUTO: 0.9 10E3/UL (ref 0–1.3)
MONOCYTES NFR BLD AUTO: 9 %
NEUTROPHILS # BLD AUTO: 6.9 10E3/UL (ref 1.6–8.3)
NEUTROPHILS NFR BLD AUTO: 69 %
NRBC # BLD AUTO: 0 10E3/UL
NRBC BLD AUTO-RTO: 0 /100
PLATELET # BLD AUTO: 222 10E3/UL (ref 150–450)
POTASSIUM SERPL-SCNC: 4.1 MMOL/L (ref 3.4–5.3)
PROT SERPL-MCNC: 8 G/DL (ref 6.4–8.3)
RBC # BLD AUTO: 5.28 10E6/UL (ref 4.4–5.9)
SODIUM SERPL-SCNC: 137 MMOL/L (ref 135–145)
TROPONIN T SERPL HS-MCNC: 7 NG/L
WBC # BLD AUTO: 10 10E3/UL (ref 4–11)

## 2024-09-13 PROCEDURE — 99204 OFFICE O/P NEW MOD 45 MIN: CPT | Performed by: STUDENT IN AN ORGANIZED HEALTH CARE EDUCATION/TRAINING PROGRAM

## 2024-09-13 PROCEDURE — 85025 COMPLETE CBC W/AUTO DIFF WBC: CPT | Mod: ZL | Performed by: STUDENT IN AN ORGANIZED HEALTH CARE EDUCATION/TRAINING PROGRAM

## 2024-09-13 PROCEDURE — 82040 ASSAY OF SERUM ALBUMIN: CPT | Mod: ZL | Performed by: STUDENT IN AN ORGANIZED HEALTH CARE EDUCATION/TRAINING PROGRAM

## 2024-09-13 PROCEDURE — 93000 ELECTROCARDIOGRAM COMPLETE: CPT | Performed by: STUDENT IN AN ORGANIZED HEALTH CARE EDUCATION/TRAINING PROGRAM

## 2024-09-13 PROCEDURE — 71046 X-RAY EXAM CHEST 2 VIEWS: CPT

## 2024-09-13 PROCEDURE — 250N000013 HC RX MED GY IP 250 OP 250 PS 637: Performed by: STUDENT IN AN ORGANIZED HEALTH CARE EDUCATION/TRAINING PROGRAM

## 2024-09-13 PROCEDURE — 83690 ASSAY OF LIPASE: CPT | Mod: ZL | Performed by: STUDENT IN AN ORGANIZED HEALTH CARE EDUCATION/TRAINING PROGRAM

## 2024-09-13 PROCEDURE — 36415 COLL VENOUS BLD VENIPUNCTURE: CPT | Mod: ZL | Performed by: STUDENT IN AN ORGANIZED HEALTH CARE EDUCATION/TRAINING PROGRAM

## 2024-09-13 PROCEDURE — 84484 ASSAY OF TROPONIN QUANT: CPT | Mod: ZL | Performed by: STUDENT IN AN ORGANIZED HEALTH CARE EDUCATION/TRAINING PROGRAM

## 2024-09-13 PROCEDURE — 74019 RADEX ABDOMEN 2 VIEWS: CPT

## 2024-09-13 PROCEDURE — 82374 ASSAY BLOOD CARBON DIOXIDE: CPT | Mod: ZL | Performed by: STUDENT IN AN ORGANIZED HEALTH CARE EDUCATION/TRAINING PROGRAM

## 2024-09-13 RX ORDER — MAGNESIUM HYDROXIDE/ALUMINUM HYDROXICE/SIMETHICONE 120; 1200; 1200 MG/30ML; MG/30ML; MG/30ML
15 SUSPENSION ORAL ONCE
Status: DISCONTINUED | OUTPATIENT
Start: 2024-09-13 | End: 2024-09-13

## 2024-09-13 RX ORDER — FAMOTIDINE 20 MG/1
20 TABLET, FILM COATED ORAL 2 TIMES DAILY
Qty: 180 TABLET | Refills: 4 | Status: SHIPPED | OUTPATIENT
Start: 2024-09-13

## 2024-09-13 RX ORDER — OMEPRAZOLE 40 MG/1
40 CAPSULE, DELAYED RELEASE ORAL DAILY
Qty: 90 CAPSULE | Refills: 3 | Status: SHIPPED | OUTPATIENT
Start: 2024-09-13

## 2024-09-13 RX ORDER — MAGNESIUM HYDROXIDE/ALUMINUM HYDROXICE/SIMETHICONE 120; 1200; 1200 MG/30ML; MG/30ML; MG/30ML
30 SUSPENSION ORAL ONCE
Status: COMPLETED | OUTPATIENT
Start: 2024-09-13 | End: 2024-09-13

## 2024-09-13 RX ORDER — SUCRALFATE 1 G/1
1 TABLET ORAL 4 TIMES DAILY
Qty: 90 TABLET | Refills: 4 | Status: SHIPPED | OUTPATIENT
Start: 2024-09-13

## 2024-09-13 RX ADMIN — ALUMINUM HYDROXIDE, MAGNESIUM HYDROXIDE, AND SIMETHICONE 30 ML: 1200; 120; 1200 SUSPENSION ORAL at 11:57

## 2024-09-13 ASSESSMENT — ANXIETY QUESTIONNAIRES
GAD7 TOTAL SCORE: 0
7. FEELING AFRAID AS IF SOMETHING AWFUL MIGHT HAPPEN: NOT AT ALL
8. IF YOU CHECKED OFF ANY PROBLEMS, HOW DIFFICULT HAVE THESE MADE IT FOR YOU TO DO YOUR WORK, TAKE CARE OF THINGS AT HOME, OR GET ALONG WITH OTHER PEOPLE?: NOT DIFFICULT AT ALL
GAD7 TOTAL SCORE: 0
GAD7 TOTAL SCORE: 0

## 2024-09-13 ASSESSMENT — PATIENT HEALTH QUESTIONNAIRE - PHQ9
SUM OF ALL RESPONSES TO PHQ QUESTIONS 1-9: 0
10. IF YOU CHECKED OFF ANY PROBLEMS, HOW DIFFICULT HAVE THESE PROBLEMS MADE IT FOR YOU TO DO YOUR WORK, TAKE CARE OF THINGS AT HOME, OR GET ALONG WITH OTHER PEOPLE: NOT DIFFICULT AT ALL
SUM OF ALL RESPONSES TO PHQ QUESTIONS 1-9: 0

## 2024-09-13 ASSESSMENT — PAIN SCALES - GENERAL: PAINLEVEL: EXTREME PAIN (9)

## 2024-09-13 NOTE — PROGRESS NOTES
Assessment & Plan   Problem List Items Addressed This Visit    None  Visit Diagnoses       Sternum pain    -  Primary    Relevant Orders    EKG 12-lead, tracing only (Completed)    Troponin T, High Sensitivity (Completed)    XR Chest 2 Views (Completed)    XR Abdomen 2 Views (Completed)    Epigastric pain        Relevant Medications    alum & mag hydroxide-simethicone (MAALOX) suspension 30 mL (Completed)    omeprazole (PRILOSEC) 40 MG DR capsule    famotidine (PEPCID) 20 MG tablet    sucralfate (CARAFATE) 1 GM tablet    Other Relevant Orders    Comprehensive Metabolic Panel (Completed)    CBC and Differential (Completed)    Lipase (Completed)    XR Chest 2 Views (Completed)    XR Abdomen 2 Views (Completed)           Sternum pain reproducible to palpation. Epigastric pain. Likely gastritis given the food and beer intake the day prior. Could have some sternum strain due to fishing but otherwise no other heavy lifting but with reproducible pain seems muscular. Wanted to rule out acute MI and pancreatitis. Troponin normal range, EKG normal per my read. Lipase normal range. All labs WNL or expected range, no evidence of infection. Abd xray without free air and CXR without signs of pneumonia or other abnormalities. Will treat as gastritis with PPI and pepcid, prn carafate. Gave GI cocktail in clinic but did not change symptoms much, recommend tylenol for sternal pain.   Given strict return precautions and discussed when to seek emergent or return care       BMI  Estimated body mass index is 33.67 kg/m  as calculated from the following:    Height as of this encounter: 1.829 m (6').    Weight as of this encounter: 112.6 kg (248 lb 4 oz).           No follow-ups on file.      Steve Avendano is a 58 year old, presenting for the following health issues:  Chest Pain (Sternum pain rated 9, tenderness upper chest, sweating, shortness of breath)    History of Present Illness       Reason for visit:  Pain in sternum   He is  taking medications regularly.       Sternum pain   - chest pain started about 1250 AM this morning. Woke him from sleep.   - can't full breath without pain  - not short of breath   - central along sternum and epigastric area  - was fishing  - had about 6 beers yesterday but otherwise has cut back on drinking  - cooked fish, did use spices  - no recent nsaid use  - no recent heavy lifting  - no emesis or diarrhea  - no bloody stools               Review of Systems  Constitutional, HEENT, cardiovascular, pulmonary, gi and gu systems are negative, except as otherwise noted.      Objective    /76 (BP Location: Right arm, Patient Position: Sitting, Cuff Size: Adult Large)   Pulse 70   Temp 97.5  F (36.4  C) (Tympanic)   Resp 24   Ht 1.829 m (6')   Wt 112.6 kg (248 lb 4 oz)   SpO2 (!) 70%   BMI 33.67 kg/m    Body mass index is 33.67 kg/m .  Physical Exam   GENERAL: alert and mild distress with deep inhale  RESP: lungs clear to auscultation - no rales, rhonchi or wheezes  CV: regular rate and rhythm, normal S1 S2, no S3 or S4, no murmur, click or rub, no peripheral edema   ABDOMEN: bowel sounds present. Soft. Non distended. Epigastric tender to palpation.   MS: sternum tender to palpation. No leg edema   SKIN: no suspicious lesions or rashes  PSYCH: mentation appears normal, affect normal/bright    EKG - Reviewed and interpreted by me appears normal, NSR, normal axis, normal intervals, no acute ST/T changes c/w ischemia, no LVH by voltage criteria, unchanged from previous tracings  Results for orders placed or performed during the hospital encounter of 09/13/24   XR Abdomen 2 Views     Status: None    Narrative    PROCEDURE: XR ABDOMEN 2 VIEWS 9/13/2024 11:42 AM    HISTORY: 12 am sudden onset epigastric and sternal pain; Sternum pain;  Epigastric pain    COMPARISONS: None.    TECHNIQUE: Flat and upright    FINDINGS: The intestinal gas pattern is normal. There is no  extraluminal gas. There are no pathologic  intra-abdominal  calcifications.         Impression    IMPRESSION: Normal abdominal gas pattern    NAKIA GAVIRIA MD         SYSTEM ID:  [id number]   Results for orders placed or performed during the hospital encounter of 09/13/24   XR Chest 2 Views     Status: None    Narrative    PROCEDURE:  XR CHEST 2 VIEWS    HISTORY:  epigastric and sternal pain, onset 12 am today; Sternum  pain; Epigastric pain.     COMPARISON:  7/9/2024    FINDINGS:   The cardiac silhouette is normal in size. The pulmonary vasculature is  normal.  The lungs are clear. No pleural effusion or pneumothorax.      Impression    IMPRESSION:  No acute cardiopulmonary disease.      NAKIA GAVIRIA MD         SYSTEM ID:  [id number]   Results for orders placed or performed in visit on 09/13/24   Troponin T, High Sensitivity     Status: Normal   Result Value Ref Range    Troponin T, High Sensitivity 7 <=22 ng/L   Comprehensive Metabolic Panel     Status: Abnormal   Result Value Ref Range    Sodium 137 135 - 145 mmol/L    Potassium 4.1 3.4 - 5.3 mmol/L    Carbon Dioxide (CO2) 25 22 - 29 mmol/L    Anion Gap 12 7 - 15 mmol/L    Urea Nitrogen 13.0 6.0 - 20.0 mg/dL    Creatinine 0.81 0.67 - 1.17 mg/dL    GFR Estimate >90 >60 mL/min/1.73m2    Calcium 9.8 8.8 - 10.4 mg/dL    Chloride 100 98 - 107 mmol/L    Glucose 126 (H) 70 - 99 mg/dL    Alkaline Phosphatase 87 40 - 150 U/L    AST 22 0 - 45 U/L    ALT 22 0 - 70 U/L    Protein Total 8.0 6.4 - 8.3 g/dL    Albumin 4.7 3.5 - 5.2 g/dL    Bilirubin Total 1.2 <=1.2 mg/dL   Lipase     Status: Normal   Result Value Ref Range    Lipase 18 13 - 60 U/L   CBC with platelets and differential     Status: None   Result Value Ref Range    WBC Count 10.0 4.0 - 11.0 10e3/uL    RBC Count 5.28 4.40 - 5.90 10e6/uL    Hemoglobin 16.2 13.3 - 17.7 g/dL    Hematocrit 47.8 40.0 - 53.0 %    MCV 91 78 - 100 fL    MCH 30.7 26.5 - 33.0 pg    MCHC 33.9 31.5 - 36.5 g/dL    RDW 13.0 10.0 - 15.0 %    Platelet Count 222 150 - 450 10e3/uL    %  Neutrophils 69 %    % Lymphocytes 20 %    % Monocytes 9 %    % Eosinophils 2 %    % Basophils 0 %    % Immature Granulocytes 0 %    NRBCs per 100 WBC 0 <1 /100    Absolute Neutrophils 6.9 1.6 - 8.3 10e3/uL    Absolute Lymphocytes 2.0 0.8 - 5.3 10e3/uL    Absolute Monocytes 0.9 0.0 - 1.3 10e3/uL    Absolute Eosinophils 0.2 0.0 - 0.7 10e3/uL    Absolute Basophils 0.0 0.0 - 0.2 10e3/uL    Absolute Immature Granulocytes 0.0 <=0.4 10e3/uL    Absolute NRBCs 0.0 10e3/uL   EKG 12-lead, tracing only     Status: None (Preliminary result)   Result Value Ref Range    Systolic Blood Pressure  mmHg    Diastolic Blood Pressure  mmHg    Ventricular Rate 66 BPM    Atrial Rate 66 BPM    TX Interval 164 ms    QRS Duration 78 ms     ms    QTc 402 ms    P Axis 48 degrees    R AXIS 58 degrees    T Axis 64 degrees    Interpretation ECG       Sinus rhythm  Early repolarization  Normal ECG  No previous ECGs available     CBC and Differential     Status: None    Narrative    The following orders were created for panel order CBC and Differential.  Procedure                               Abnormality         Status                     ---------                               -----------         ------                     CBC with platelets and d...[[id number]]                      Final result                 Please view results for these tests on the individual orders.     XR Abdomen 2 Views    Result Date: 9/13/2024  PROCEDURE: XR ABDOMEN 2 VIEWS 9/13/2024 11:42 AM HISTORY: 12 am sudden onset epigastric and sternal pain; Sternum pain; Epigastric pain COMPARISONS: None. TECHNIQUE: Flat and upright FINDINGS: The intestinal gas pattern is normal. There is no extraluminal gas. There are no pathologic intra-abdominal calcifications.        IMPRESSION: Normal abdominal gas pattern NAKIA GAVIRIA MD   SYSTEM ID:  [id number]    XR Chest 2 Views    Result Date: 9/13/2024  PROCEDURE:  XR CHEST 2 VIEWS HISTORY:  epigastric and sternal pain, onset 12  am today; Sternum pain; Epigastric pain. COMPARISON:  7/9/2024 FINDINGS: The cardiac silhouette is normal in size. The pulmonary vasculature is normal.  The lungs are clear. No pleural effusion or pneumothorax.     IMPRESSION:  No acute cardiopulmonary disease.  NAKIA GAVIRIA MD   SYSTEM ID:  [id number]         Signed Electronically by: Merly Cohen MD

## 2024-09-13 NOTE — RESULT ENCOUNTER NOTE
Results discussed directly with patient while patient was present. Any further details documented in the note.   Merly Cohen MD

## 2024-09-13 NOTE — NURSING NOTE
Chief Complaint   Patient presents with    Chest Pain     Sternum pain rated 9, tenderness upper chest, sweating, shortness of breath       Initial /76 (BP Location: Right arm, Patient Position: Sitting, Cuff Size: Adult Large)   Pulse 70   Temp 97.5  F (36.4  C) (Tympanic)   Resp 24   Ht 1.829 m (6')   Wt 112.6 kg (248 lb 4 oz)   SpO2 (!) 70%   BMI 33.67 kg/m   Estimated body mass index is 33.67 kg/m  as calculated from the following:    Height as of this encounter: 1.829 m (6').    Weight as of this encounter: 112.6 kg (248 lb 4 oz).  Medication Review: complete    The next two questions are to help us understand your food security.  If you are feeling you need any assistance in this area, we have resources available to support you today.           No data to display                  Health Care Directive:  Patient does not have a Health Care Directive or Living Will: Discussed advance care planning with patient; however, patient declined at this time.    Liana Hameed LPN

## 2024-09-17 LAB
ATRIAL RATE - MUSE: 66 BPM
DIASTOLIC BLOOD PRESSURE - MUSE: NORMAL MMHG
INTERPRETATION ECG - MUSE: NORMAL
P AXIS - MUSE: 48 DEGREES
PR INTERVAL - MUSE: 164 MS
QRS DURATION - MUSE: 78 MS
QT - MUSE: 384 MS
QTC - MUSE: 402 MS
R AXIS - MUSE: 58 DEGREES
SYSTOLIC BLOOD PRESSURE - MUSE: NORMAL MMHG
T AXIS - MUSE: 64 DEGREES
VENTRICULAR RATE- MUSE: 66 BPM

## 2025-04-17 ENCOUNTER — OFFICE VISIT (OUTPATIENT)
Dept: FAMILY MEDICINE | Facility: CLINIC | Age: 60
End: 2025-04-17
Payer: COMMERCIAL

## 2025-04-17 ENCOUNTER — ANCILLARY PROCEDURE (OUTPATIENT)
Dept: GENERAL RADIOLOGY | Facility: CLINIC | Age: 60
End: 2025-04-17
Attending: PHYSICIAN ASSISTANT
Payer: COMMERCIAL

## 2025-04-17 VITALS
TEMPERATURE: 97.1 F | DIASTOLIC BLOOD PRESSURE: 78 MMHG | HEART RATE: 72 BPM | OXYGEN SATURATION: 99 % | WEIGHT: 233 LBS | RESPIRATION RATE: 17 BRPM | BODY MASS INDEX: 32.62 KG/M2 | SYSTOLIC BLOOD PRESSURE: 132 MMHG | HEIGHT: 71 IN

## 2025-04-17 DIAGNOSIS — M79.672 LEFT FOOT PAIN: Primary | ICD-10-CM

## 2025-04-17 DIAGNOSIS — M79.672 LEFT FOOT PAIN: ICD-10-CM

## 2025-04-17 DIAGNOSIS — M79.5 FOREIGN BODY (FB) IN SOFT TISSUE: ICD-10-CM

## 2025-04-17 ASSESSMENT — PAIN SCALES - GENERAL: PAINLEVEL_OUTOF10: MODERATE PAIN (5)

## 2025-04-17 NOTE — PROGRESS NOTES
"  Assessment & Plan       ICD-10-CM    1. Left foot pain  M79.672 XR Foot Left G/E 3 Views     REMOVE FOREIGN BODY SIMPLE      2. Foreign body (FB) in soft tissue  M79.5 REMOVE FOREIGN BODY SIMPLE      Talk to patient about his concerns I do believe he has stainless steel sliver in his foot.  This is confirmed with x-ray.  I numbed up the skin tissue with 1% with epi.  I used 11 blade to make a small superficial incision and was able to express out the stainless steel sliver which was about 5 mm in length.  There was some slight milky discharge that was expressed.  Bacitracin with dressing was applied.  He will watch for signs of infection.  Recheck in a week as needed.      Steve Avendano is a 59 year old, presenting for the following health issues:  Musculoskeletal Problem        4/17/2025     9:02 AM   Additional Questions   Roomed by Amelia     History of Present Illness       Reason for visit:  Foot pain  Symptom onset:  More than a month  Symptoms include:  Foot pain  Symptom intensity:  Moderate  Symptom progression:  Staying the same  Had these symptoms before:  No  What makes it worse:  Walking  What makes it better:  Sitting   He is taking medications regularly.      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   Left foot on lateral aspect is painful to touch and walk. Thought is was a wart but gets sharp pains. No redness or discharge or fevers.     Review of Systems  Constitutional, HEENT, cardiovascular, pulmonary, gi and gu systems are negative, except as otherwise noted.      Objective    /78   Pulse 72   Temp 97.1  F (36.2  C) (Tympanic)   Resp 17   Ht 1.803 m (5' 11\")   Wt 105.7 kg (233 lb)   SpO2 99%   BMI 32.50 kg/m    Body mass index is 32.5 kg/m .  Physical Exam   GENERAL: alert and no distress  Bottom of left foot lateral plantar aspect there is a 5 mm warty lesion with central pin point dark spot " that is very tender. No signs of infection.         X-ray left foot: On the lateral aspect a small metallic sliver type lesion identified.  Pending radiology     Signed Electronically by: Hong Carpio PA-C

## (undated) RX ORDER — MAGNESIUM HYDROXIDE/ALUMINUM HYDROXICE/SIMETHICONE 120; 1200; 1200 MG/30ML; MG/30ML; MG/30ML
SUSPENSION ORAL
Status: DISPENSED
Start: 2024-09-13